# Patient Record
Sex: MALE | Race: BLACK OR AFRICAN AMERICAN | NOT HISPANIC OR LATINO | Employment: UNEMPLOYED | URBAN - METROPOLITAN AREA
[De-identification: names, ages, dates, MRNs, and addresses within clinical notes are randomized per-mention and may not be internally consistent; named-entity substitution may affect disease eponyms.]

---

## 2017-05-25 ENCOUNTER — HOSPITAL ENCOUNTER (EMERGENCY)
Facility: CLINIC | Age: 37
Discharge: HOME | End: 2017-05-25
Attending: EMERGENCY MEDICINE | Admitting: EMERGENCY MEDICINE

## 2017-05-25 VITALS
TEMPERATURE: 98.6 F | SYSTOLIC BLOOD PRESSURE: 147 MMHG | DIASTOLIC BLOOD PRESSURE: 103 MMHG | OXYGEN SATURATION: 98 % | HEIGHT: 72 IN

## 2017-05-25 DIAGNOSIS — H61.23 BILATERAL IMPACTED CERUMEN: ICD-10-CM

## 2017-05-25 DIAGNOSIS — H66.92 LEFT OTITIS MEDIA, UNSPECIFIED CHRONICITY, UNSPECIFIED OTITIS MEDIA TYPE: ICD-10-CM

## 2017-05-25 PROCEDURE — 99283 EMERGENCY DEPT VISIT LOW MDM: CPT | Mod: 25

## 2017-05-25 PROCEDURE — 69209 REMOVE IMPACTED EAR WAX UNI: CPT | Mod: 50

## 2017-05-25 PROCEDURE — 99282 EMERGENCY DEPT VISIT SF MDM: CPT

## 2017-05-25 RX ORDER — AMOXICILLIN 500 MG/1
500 CAPSULE ORAL 3 TIMES DAILY
Qty: 30 CAPSULE | Refills: 0 | Status: SHIPPED | OUTPATIENT
Start: 2017-05-25 | End: 2017-06-04

## 2017-05-25 ASSESSMENT — ENCOUNTER SYMPTOMS
VOMITING: 0
SINUS PRESSURE: 1
FEVER: 0
RHINORRHEA: 0

## 2017-05-25 NOTE — ED AVS SNAPSHOT
Emergency Department    [address] [facility] [address]    Phone:  [phone]    Fax:  [phone]                                       Tanvi Tinajero   MRN: [id number]    Department:   Emergency Department   Date of Visit:  5/25/2017           Patient Information     Date Of Birth          1980        Your diagnoses for this visit were:     Bilateral impacted cerumen     Left otitis media, unspecified chronicity, unspecified otitis media type        You were seen by Vernon Montaño DO.      Follow-up Information     Follow up with [facility] In 2 days.    Specialty:  Family Practice    Contact information:    [address] [facility] [address] [phone]        Follow up with  Emergency Department.    Specialty:  EMERGENCY MEDICINE    Why:  If symptoms worsen    Contact information:    [address] [facility] [address] [phone]        Discharge Instructions         Earwax, Home Treatment    Everyone produces earwax from the lining of the ear canal. It serves to lubricate and protect the ear. The wax that forms in the canal naturally moves toward the outside of the ear and falls out. Sometimes the ear canal may contain too much wax. This can cause a blockage and loss of hearing. Directions are given below for home treatment.  Home care  If your doctor has advised you to remove a wax blockage yourself, follow these directions:    Unless a medicine was prescribed, you may use an over-the-counter product made for clearing earwax. These contain carbamide peroxide. Lie down with the blocked ear facing upward. Apply one dropper full of medicine and wait a few minutes. Grasp the outer ear and wiggle it to help the solution enter the canal.    Lean over a sink or basin with the blocked ear facing downward. Use a bulb syringe filled with warm (not hot or cold) water to rinse the ear several times. Use gentle pressure  only.    If you are having trouble draining the water out of your ear canal, put a few drops of rubbing alcohol (isopropyl alcohol) into the ear canal. This will help remove the remaining water.    Repeat this procedure once a day for up to three days, or until your hearing is back to normal. Do not use this treatment for more than three days in a row.  Don ts    Don t use cold water to rinse the ear. This will make you dizzy.    Don t perform this procedure if you have an ear infection.    Don t perform this procedure if you have a ruptured eardrum.    Don t use cotton swabs, matches, hairpins, keys, or other objects to  clean  the ear canal. This can cause infection of the ear canal or rupture the eardrum. Because of their size and shape, cotton swabs can push earwax deeper into the ear canal instead of removing it.  Follow-up care  Follow up with your health care provider if you are not improving after three cleaning attempts, or as advised.  When to seek medical advice  Call your health care provider right away if any of these occur:    Worsening ear pain    Fever of 101 F (38.3 C) or higher, or as directed by your health care provider    Hearing does not return to normal after three days of treatment    Fluid drainage or bleeding from the ear canal    Swelling, redness, or tenderness of the outer ear    Headache, neck pain, or stiff neck    0703-0373 [employer]. [address] [place] [address]. All rights reserved. This information is not intended as a substitute for professional medical care. Always follow your healthcare professional's instructions.          24 Hour Appointment Hotline       To make an appointment at any [facility], call [phone] ([phone]). If you don't have a family doctor or clinic, we will help you find one. [facility] clinics are conveniently located to serve the needs of you and your family.             Review of your medicines      START taking         Dose / Directions Last dose taken    amoxicillin 500 MG capsule   Commonly known as:  AMOXIL   Dose:  500 mg   Quantity:  30 capsule        Take 1 capsule (500 mg) by mouth 3 times daily for 10 days   Refills:  0                Prescriptions were sent or printed at these locations (1 Prescription)                   formerly [facility] [address]  [place], [place] - [address] AT [address] [place] [address] [place] [address]    Telephone:  [phone]   Fax:  [phone]   Hours:                  E-Prescribed (1 of 1)         amoxicillin (AMOXIL) 500 MG capsule                Orders Needing Specimen Collection     None      Pending Results     No orders found from 5/23/2017 to 5/26/2017.            Pending Culture Results     No orders found from 5/23/2017 to 5/26/2017.            Pending Results Instructions     If you had any lab results that were not finalized at the time of your Discharge, you can call the ED Lab Result RN at [phone]. You will be contacted by this team for any positive Lab results or changes in treatment. The nurses are available 7 days a week from 10A to 6:30P.  You can leave a message 24 hours per day and they will return your call.        Test Results From Your Hospital Stay               Clinical Quality Measure: Blood Pressure Screening     Your blood pressure was checked while you were in the emergency department today. The last reading we obtained was  BP: (!) 147/103 . Please read the guidelines below about what these numbers mean and what you should do about them.  If your systolic blood pressure (the top number) is less than 120 and your diastolic blood pressure (the bottom number) is less than 80, then your blood pressure is normal. There is nothing more that you need to do about it.  If your systolic blood pressure (the top number) is 120-139 or your diastolic blood pressure (the bottom number) is 80-89, your blood pressure may be higher than it should be.  "You should have your blood pressure rechecked within a year by a primary care provider.  If your systolic blood pressure (the top number) is 140 or greater or your diastolic blood pressure (the bottom number) is 90 or greater, you may have high blood pressure. High blood pressure is treatable, but if left untreated over time it can put you at risk for heart attack, stroke, or kidney failure. You should have your blood pressure rechecked by a primary care provider within the next 4 weeks.  If your provider in the emergency department today gave you specific instructions to follow-up with your doctor or provider even sooner than that, you should follow that instruction and not wait for up to 4 weeks for your follow-up visit.        Thank you for choosing [facility]       Thank you for choosing [facility] for your care. Our goal is always to provide you with excellent care. Hearing back from our patients is one way we can continue to improve our services. Please take a few minutes to complete the written survey that you may receive in the mail after you visit with us. Thank you!        vushaperhariContact Information     [employer] lets you send messages to your doctor, view your test results, renew your prescriptions, schedule appointments and more. To sign up, go to [email or web address] . Click on \"Log in\" on the left side of the screen, which will take you to the Welcome page. Then click on \"Sign up Now\" on the right side of the page.     You will be asked to enter the access code listed below, as well as some personal information. Please follow the directions to create your username and password.     Your access code is: [email or web address]  Expires: 2017  1:52 AM     Your access code will  in 90 days. If you need help or a new code, please call your [facility] clinic or [phone].        Care EveryWhere ID     This is your Care EveryWhere ID. This could be used by other organizations to access your [facility] medical " records  [id number]        After Visit Summary       This is your record. Keep this with you and show to your community pharmacist(s) and doctor(s) at your next visit.

## 2017-05-25 NOTE — DISCHARGE INSTRUCTIONS
Earwax, Home Treatment    Everyone produces earwax from the lining of the ear canal. It serves to lubricate and protect the ear. The wax that forms in the canal naturally moves toward the outside of the ear and falls out. Sometimes the ear canal may contain too much wax. This can cause a blockage and loss of hearing. Directions are given below for home treatment.  Home care  If your doctor has advised you to remove a wax blockage yourself, follow these directions:    Unless a medicine was prescribed, you may use an over-the-counter product made for clearing earwax. These contain carbamide peroxide. Lie down with the blocked ear facing upward. Apply one dropper full of medicine and wait a few minutes. Grasp the outer ear and wiggle it to help the solution enter the canal.    Lean over a sink or basin with the blocked ear facing downward. Use a bulb syringe filled with warm (not hot or cold) water to rinse the ear several times. Use gentle pressure only.    If you are having trouble draining the water out of your ear canal, put a few drops of rubbing alcohol (isopropyl alcohol) into the ear canal. This will help remove the remaining water.    Repeat this procedure once a day for up to three days, or until your hearing is back to normal. Do not use this treatment for more than three days in a row.  Don ts    Don t use cold water to rinse the ear. This will make you dizzy.    Don t perform this procedure if you have an ear infection.    Don t perform this procedure if you have a ruptured eardrum.    Don t use cotton swabs, matches, hairpins, keys, or other objects to  clean  the ear canal. This can cause infection of the ear canal or rupture the eardrum. Because of their size and shape, cotton swabs can push earwax deeper into the ear canal instead of removing it.  Follow-up care  Follow up with your health care provider if you are not improving after three cleaning attempts, or as advised.  When to seek medical  advice  Call your health care provider right away if any of these occur:    Worsening ear pain    Fever of 101 F (38.3 C) or higher, or as directed by your health care provider    Hearing does not return to normal after three days of treatment    Fluid drainage or bleeding from the ear canal    Swelling, redness, or tenderness of the outer ear    Headache, neck pain, or stiff neck    1227-9055 [employer]. [address] [place] [address]. All rights reserved. This information is not intended as a substitute for professional medical care. Always follow your healthcare professional's instructions.

## 2017-05-25 NOTE — ED PROVIDER NOTES
History     Chief Complaint:  Otalgia    HPI   Tanvi Tinajero is a 37 year old male who presents to the emergency department for evaluation of bilateral otalgia. The patient reported that he has been experiencing bilateral ear pain for the last 30 days and has attempted to alleviate the pain with over-the-counter medications and cleaning of the ears with cotton tip swabs, but was unsuccessful. He also reported that he was also experiencing upper respiratory congestion and nasal congestion. He denies experiencing any rhinorrhea, fever, rash or exposure to measles.     Allergies:  No Known Drug Allergies      Medications:    The patient is not currently taking any prescribed medications.     Past Medical History:    The patient denies any relevant past medical history.     Past Surgical History:    History reviewed. No pertinent past surgical history.     Family History:    The patient denies any relevant family medical history.     Social History:  The patient was accompanied to the ED alone.  Smoking Status: N/A  Smokeless Tobacco: N/A  Alcohol Use: N/A      Review of Systems   Constitutional: Negative for fever.   HENT: Positive for ear pain and sinus pressure (Nasal congestion). Negative for rhinorrhea.    Gastrointestinal: Negative for vomiting.   Skin: Negative for rash.     Physical Exam   First Vitals:  Patient Vitals for the past 24 hrs:   BP Temp Temp src Heart Rate SpO2 Height   05/25/17 0026 (!) 147/103 98.6  F (37  C) Oral 108 98 % 1.829 m (6')          Physical Exam  Physical Exam   General:  Sitting on bed alone, comfortable appearing.   HENT:  No obvious trauma to head  Right Ear:  External ear normal. Cerumen impaction in ear canal. TM was normal in appearance after irrigation.  Left Ear:  External ear normal. Cerumen impaction in ear canal.TM was erythematous and bulging after irrigation.  Nose:  Nose normal.   Eyes:  Conjunctivae and EOM are normal.  Neck: Normal range of motion. Neck  supple. No tracheal deviation present.   Pulm/Chest: No respiratory distress  M/S: Normal range of motion.   Neuro: Alert. GCS 15.  Skin: Skin is warm and dry. No rash noted. Not diaphoretic.   Psych: Normal mood and affect. Behavior is normal.     Emergency Department Course   Emergency Department Course:  Nursing notes and vitals reviewed.  I performed an exam of the patient as documented above.   Cerumen disimpaction by tech.    I discussed the treatment plan with the patient. They expressed understanding of this plan and consented to discharge. They will be discharged home with instructions for care and follow up. In addition, the patient will return to the emergency department if their symptoms persist, worsen, if new symptoms arise or if there is any concern.  All questions were answered.    I personally reviewed the laboratory results with the Patient and answered all related questions prior to discharge.    Impression & Plan      Medical Decision Making:  Tanvi Tinajero is a very pleasant 37 year old male who presents for evaluation of otalgia on each side.  The patient has an exam consistent with bilateral cerumen impaction.  Differential considered in this patient with otalgia included mastoiditis, meningitis, perforation, mass, dental abscess, or peritonsillar abscess, referred pain, cholesteatoma, otitis externa, etc. Given exam, the most likely etiology of the pain is impacted cerumen. Once the cerumen was removed, the pt was noted to have a left TM that was erythematous and bulging and pain was still present. There was no evidence of perforation. He will be placed on amoxicillin for this. Pain was alleviated with flushing the canals.  He may take Tylenol or Ibuprofen or auralgan for pain.  Return if increasing pain, fever, decrease in hearing or ear discharge.  Follow-up with primary physician in 7-10 days, if symptoms persist then an ENT consultation may be needed as outpatient.    The  treatment plan was discussed with the patient and they expressed understanding of this plan and consented to the plan.  In addition, the patient will return to the emergency department if their symptoms persist, worsen, if new symptoms arise or if there is any concern as other pathology may be present that is not evident at this time. They also understand the importance of close follow up in the clinic and if unable to do so will return to the emergency department for a reevaluation. All questions were answered.    Diagnosis:    ICD-10-CM    1. Bilateral impacted cerumen H61.23    2. Left otitis media, unspecified chronicity, unspecified otitis media type H66.92        Disposition:   Discharged    Scribe Disclosure:  Hafsa KAUR, am serving as a scribe at 12:32 AM on 5/25/2017 to document services personally performed by Vernon Montaño DO, based on my observations and the provider's statements to me.    5/25/2017    EMERGENCY DEPARTMENT       Vernon Montaño DO  05/25/17 0235

## 2017-05-25 NOTE — ED AVS SNAPSHOT
Emergency Department    [address] [place] [address]    Phone:  [phone]    Fax:  [phone]                                       Tanvi Tinajero   MRN: [id number]    Department:   Emergency Department   Date of Visit:  5/25/2017           After Visit Summary Signature Page     I have received my discharge instructions, and my questions have been answered. I have discussed any challenges I see with this plan with the nurse or doctor.    ..........................................................................................................................................  Patient/Patient Representative Signature      ..........................................................................................................................................  Patient Representative Print Name and Relationship to Patient    ..................................................               ................................................  Date                                            Time    ..........................................................................................................................................  Reviewed by Signature/Title    ...................................................              ..............................................  Date                                                            Time

## 2017-06-01 ENCOUNTER — OFFICE VISIT (OUTPATIENT)
Dept: FAMILY MEDICINE | Facility: CLINIC | Age: 37
End: 2017-06-01

## 2017-06-01 VITALS
BODY MASS INDEX: 24.08 KG/M2 | WEIGHT: 158.4 LBS | SYSTOLIC BLOOD PRESSURE: 101 MMHG | OXYGEN SATURATION: 98 % | HEART RATE: 65 BPM | RESPIRATION RATE: 16 BRPM | DIASTOLIC BLOOD PRESSURE: 69 MMHG | TEMPERATURE: 97.9 F

## 2017-06-01 DIAGNOSIS — K59.01 SLOW TRANSIT CONSTIPATION: ICD-10-CM

## 2017-06-01 ASSESSMENT — ENCOUNTER SYMPTOMS
VOMITING: 0
FREQUENCY: 0
HEADACHES: 0
DYSURIA: 0
MYALGIAS: 0
FEVER: 0
DIARRHEA: 0
LIGHT-HEADEDNESS: 0
WHEEZING: 0
HEMATURIA: 0
UNEXPECTED WEIGHT CHANGE: 0
JOINT SWELLING: 0
RHINORRHEA: 0
NAUSEA: 0
WEAKNESS: 0
SORE THROAT: 0
CONSTIPATION: 1
DIZZINESS: 0
ABDOMINAL PAIN: 0
CHILLS: 0
TROUBLE SWALLOWING: 0
PALPITATIONS: 0
SHORTNESS OF BREATH: 0
FATIGUE: 1

## 2017-06-01 NOTE — PROGRESS NOTES
HPI:       Toribio Rooney is a 37 year old who presents for the following  Patient presents with:  Physical: annual physical     Mr. Rooney is here for a physical exam today. He says it's been a long time since coming to the doctor. He is not here for a specific complaint but states that his back and shoulders occasionally itch but are not painful. He complains of constipation; he is only going twice each week. Lastly, Mr. Rooney complains of occasional fatigue.     Mr. Rooney also requested vitamin D.     A [employer]  was used for  this visit.      Problem, Medication and Allergy Lists were reviewed and are current.  Patient is an established patient of this clinic.         Review of Systems:   Review of Systems   Constitutional: Positive for fatigue. Negative for chills, fever and unexpected weight change.   HENT: Negative for congestion, rhinorrhea, sore throat and trouble swallowing.    Eyes: Negative for visual disturbance.   Respiratory: Negative for shortness of breath and wheezing.    Cardiovascular: Negative for chest pain and palpitations.   Gastrointestinal: Positive for constipation. Negative for abdominal pain, diarrhea, nausea and vomiting.   Genitourinary: Negative for dysuria, frequency and hematuria.   Musculoskeletal: Negative for joint swelling and myalgias.   Skin: Negative for rash.   Neurological: Negative for dizziness, weakness, light-headedness and headaches.             Physical Exam:   Patient Vitals for the past 24 hrs:   BP Temp Temp src Pulse Resp SpO2 Weight   06/01/17 0931 101/69 97.9  F (36.6  C) Oral 65 16 98 % 158 lb 6.4 oz (71.8 kg)     Body mass index is 24.08 kg/(m^2).  Vitals were reviewed and were normal     Physical Exam   Constitutional: He is oriented to person, place, and time. No distress.   HENT:   Head: Normocephalic and atraumatic.   Mouth/Throat: Oropharynx is clear and moist.   Eyes: Conjunctivae and EOM are normal. Pupils are equal, round, and  reactive to light. No scleral icterus.   Neck: Normal range of motion. Neck supple. No JVD present.   Cardiovascular: Normal rate, regular rhythm and normal heart sounds.  Exam reveals no gallop and no friction rub.    No murmur heard.  Pulmonary/Chest: Effort normal and breath sounds normal. No respiratory distress. He has no wheezes. He has no rales.   Abdominal: Soft. Bowel sounds are normal. He exhibits no distension. There is no tenderness. There is no rebound and no guarding.   Moderately increased abdominal circumference.    Musculoskeletal: Normal range of motion. He exhibits no edema or deformity.   Neurological: He is alert and oriented to person, place, and time. No cranial nerve deficit. Coordination normal.   Skin: Skin is warm and dry. No rash noted. He is not diaphoretic. No erythema.   Psychiatric: He has a normal mood and affect. His behavior is normal.         Results:     None.     Assessment and Plan     #. Constipation, likely 2/2 poor fiber intake. He has been taking bisacodyl regularly without noticing a significant change.   - Recommended, life style modification: Eat on daily basis: Papaya, prunes, magen and beans. Aerobic exercises 30 minutes 5 times per week.     #. Basic Health Maintenance  Mr. Rooney's weight gain and activity was discussed today. He was counseled to increase his daily exercise activity and decrease the amount of sugar he consumes on a daily basis. He was counseled on the risk of heart and liver disease as well as diabetes if his weight continues to increase. Vitamin D was discussed and patient was counseled to get more sun exposure during the summer rather than taking vitamin D supplements.   - Dietary changes; less sugar  - Increase physical activity / exercise   - Increase outdoor activity    Medications Discontinued During This Encounter   Medication Reason     oxyCODONE (ROXICODONE) 5 MG immediate release tablet Stopped by Patient     bisacodyl (DULCOLAX) 5 MG EC  tablet Not Effective     Options for treatment and follow-up care were reviewed with the patient. Toribio Rooney  engaged in the decision making process and verbalized understanding of the options discussed and agreed with the final plan.    Scribed by Moy Mckeon, MS4, on behalf of MD Dewayne Brennan MD

## 2017-06-01 NOTE — MR AVS SNAPSHOT
After Visit Summary   6/1/2017    Toribio Rooney    MRN: [id number]           Patient Information     Date Of Birth          1980        Visit Information        Provider Department      6/1/2017 9:20 AM Dewayne Payne MD [facility]        Today's Diagnoses     Slow transit constipation           Follow-ups after your visit        Follow-up notes from your care team     Return in about 1 month (around 7/1/2017) for Symptoms follow up..      Who to contact     Please call your clinic at [phone] to:    Ask questions about your health    Make or cancel appointments    Discuss your medicines    Learn about your test results    Speak to your doctor   If you have compliments or concerns about an experience at your clinic, or if you wish to file a complaint, please contact [facility] Patient Relations at [phone] or email us at [email or web address]         Additional Information About Your Visit        MyChart Information     [employer] gives you secure access to your electronic health record. If you see a primary care provider, you can also send messages to your care team and make appointments. If you have questions, please call your primary care clinic.  If you do not have a primary care provider, please call [phone] and they will assist you.      [employer] is an electronic gateway that provides easy, online access to your medical records. With [employer], you can request a clinic appointment, read your test results, renew a prescription or communicate with your care team.     To access your existing account, please contact your [facility] or call [phone] for assistance.        Care EveryWhere ID     This is your Care EveryWhere ID. This could be used by other organizations to access your [place] medical records  [id number]        Your Vitals Were     Pulse Temperature Respirations Pulse  Oximetry BMI (Body Mass Index)       65 97.9  F (36.6  C) (Oral) 16 98% 24.08 kg/m2        Blood Pressure from Last 3 Encounters:   06/01/17 101/69   06/15/16 101/71   04/12/16 101/59    Weight from Last 3 Encounters:   06/01/17 158 lb 6.4 oz (71.8 kg)   06/15/16 154 lb 3.2 oz (69.9 kg)   03/16/16 155 lb (70.3 kg)              Today, you had the following     No orders found for display         Today's Medication Changes          These changes are accurate as of: 6/1/17  1:17 PM.  If you have any questions, ask your nurse or doctor.               These medicines have changed or have updated prescriptions.        Dose/Directions    ibuprofen 600 MG tablet   Commonly known as:  ADVIL/MOTRIN   This may have changed:  how much to take   Used for:  Neck pain        Dose:  600 mg   Take 1 tablet (600 mg) by mouth every 6 hours as needed for moderate pain   Quantity:  90 tablet   Refills:  1         Stop taking these medicines if you haven't already. Please contact your care team if you have questions.     bisacodyl 5 MG EC tablet   Commonly known as:  DULCOLAX   Stopped by:  Dewayne Payne MD                    Primary Care Provider Office Phone # Fax #    Mirza Sosa [phone]       [facility] [address] [facility] [address]        Thank you!     Thank you for choosing [facility]  for your care. Our goal is always to provide you with excellent care. Hearing back from our patients is one way we can continue to improve our services. Please take a few minutes to complete the written survey that you may receive in the mail after your visit with us. Thank you!             Your Updated Medication List - Protect others around you: Learn how to safely use, store and throw away your medicines at www.disposemymeds.org.          This list is accurate as of: 6/1/17  1:17 PM.  Always use your most recent med list.                   Brand Name Dispense Instructions for use     acetaminophen 325 MG tablet    TYLENOL    100 tablet    Take 3 tablets (975 mg) by mouth every 8 hours       ibuprofen 600 MG tablet    ADVIL/MOTRIN    90 tablet    Take 1 tablet (600 mg) by mouth every 6 hours as needed for moderate pain       PARoxetine 20 MG tablet    PAXIL    60 tablet    Take 1 tablet (20 mg) by mouth every morning

## 2017-07-07 ENCOUNTER — OFFICE VISIT (OUTPATIENT)
Dept: FAMILY MEDICINE | Facility: CLINIC | Age: 37
End: 2017-07-07

## 2017-07-07 VITALS
OXYGEN SATURATION: 97 % | BODY MASS INDEX: 24.05 KG/M2 | RESPIRATION RATE: 16 BRPM | DIASTOLIC BLOOD PRESSURE: 76 MMHG | WEIGHT: 158.2 LBS | HEART RATE: 80 BPM | SYSTOLIC BLOOD PRESSURE: 108 MMHG | TEMPERATURE: 97.9 F

## 2017-07-07 DIAGNOSIS — B00.9 HERPES SIMPLEX VIRUS (HSV) INFECTION: Primary | ICD-10-CM

## 2017-07-07 RX ORDER — VALACYCLOVIR HYDROCHLORIDE 500 MG/1
500 TABLET, FILM COATED ORAL DAILY
Qty: 90 TABLET | Refills: 3 | Status: SHIPPED | OUTPATIENT
Start: 2017-07-07 | End: 2018-08-10

## 2017-07-07 RX ORDER — VALACYCLOVIR HYDROCHLORIDE 1 G/1
2000 TABLET, FILM COATED ORAL 2 TIMES DAILY
Qty: 4 TABLET | Refills: 0 | Status: SHIPPED | OUTPATIENT
Start: 2017-07-07 | End: 2017-07-08

## 2017-07-07 NOTE — MR AVS SNAPSHOT
After Visit Summary   7/7/2017    Toribio Rooney    MRN: [id number]           Patient Information     Date Of Birth          1980        Visit Information        Provider Department      7/7/2017 8:40 AM Mirza Sosa MD [facility]        Today's Diagnoses     Herpes simplex virus (HSV) infection    -  1      Care Instructions    Here is the plan from today's visit    1. Herpes simplex virus (HSV) infection  Take the larger pill 2 tablets two times a day today  Starting tomorrow, take the smaller pill once daily   - valACYclovir (VALTREX) 1000 mg tablet; Take 2 tablets (2,000 mg) by mouth 2 times daily for 1 day  Dispense: 4 tablet; Refill: 0  - valACYclovir (VALTREX) 500 MG tablet; Take 1 tablet (500 mg) by mouth daily  Dispense: 90 tablet; Refill: 3    Please call or return to clinic if your symptoms don't go away.    Thank you for coming to [facility] today.  Lab Testing:  **If you had lab testing today and your results are reassuring or normal they will be mailed to you or sent through [employer] within 7 days.   **If the lab tests need quick action we will call you with the results.  The phone number we will call with results is # [phone] (home) . If this is not the best number please call our clinic and change the number.  Medication Refills:  If you need any refills please call your pharmacy and they will contact us.   If you need to  your refill at a new pharmacy, please contact the new pharmacy directly. The new pharmacy will help you get your medications transferred faster.   Scheduling:  If you have any concerns about today's visit or wish to schedule another appointment please call our office during normal business hours [phone] (8-5:00 M-F)  If a referral was made to a [facility] and you don't get a call from central scheduling please call [phone].  If a Mammogram was ordered for you at [facility] call  [phone] to schedule or change your appointment.  If you had an XRay/CT/Ultrasound/MRI ordered the number is [phone] to schedule or change your radiology appointment.   Medical Concerns:  If you have urgent medical concerns please call [phone] at any time of the day.        Cold Sores (Fever Blisters)  What are cold sores?   Cold sores are annoying, small, painful blisters on the lips and nearby skin, including in the nose and mouth. They usually appear when you are sick or stressed. They are also called fever blisters.   How do they occur?   Cold sores are caused by the herpes simplex virus (HSV). This virus also causes genital herpes. The fluid in the blisters contains live virus. The virus in this fluid can easily be spread from one person to another. The infection can be spread, for example, by kissing, by sharing food or drink, or by not washing your hands after touching the sores.   Once you are infected, the virus continues to live in the body, even after the sores are gone. The virus may become active again and cause more cold sores at any time. It is especially likely to become active again during or after:   skin injury, such as a scrape or too much exposure to the sun   physical illness, such as a cold or flu   dental treatment   emotional stress   fatigue   hormonal changes caused by pregnancy or a woman's menstrual cycle.   It is not possible to predict how often you will have cold sores. Some people never have them again after the first time, but others have them regularly.   What are the symptoms?   About 24 hours before you can see blisters, you may have a sense of numbness, tingling, itching, or burning. Then a small cluster of tiny blisters appears on your lip or the skin around your lips. The blisters may be somewhat painful. Over the next few days, the blisters break and fluid drains out. This fluid is very contagious. As the blisters dry, they become sores that are covered with a  yellowish dried crust and they become less painful.   How are they diagnosed?   Your healthcare provider can usually determine from your history and a physical exam whether the blisters are fever blisters. Fluid from the blisters may be tested in the lab to confirm the diagnosis of herpes simplex virus. The test is called a viral culture.   How are they treated?   There are many nonprescription medications that provide some relief from the symptoms. A nonprescription antiviral medicine applied several times a day to the area as soon as the symptoms start may lessen symptoms. It may also help the sores heal more quickly.   Your healthcare provider may prescribe an oral antiviral medicine, such as acyclovir, valacyclovir, or famciclovir. This medicine stops the virus from making more viruses in your body. To prevent the blisters, the medicine must be taken when you first start having symptoms. The medicine does not get rid of the virus, but it can decrease the number of days you have symptoms and help the blisters dry up more quickly. If you have cold sores often, your provider may recommend taking antiviral medicine daily to try to stop outbreaks from recurring. Or your provider may prescribe an antiviral medicine for you to take when you know you are going to be exposed to something that causes you to have cold sores, such as a lot of sun or stress   How long will the effects last?   The blisters usually last 7 to 10 days. They should be considered contagious as long as you have any moist secretions from the blisters. They may return often (for example, several times a year) or rarely, such as once every few years.   How can I take care of myself?   Taking a nonprescription painkiller such as aspirin, acetaminophen, or ibuprofen may help cold sores feel less painful. Check with your healthcare provider before you give any medicine that contains aspirin or salicylates to a child or teen. This includes medicines like  baby aspirin, some cold medicines, and Pepto Bismol. Children and teens who take aspirin are at risk for a serious illness called Reye's syndrome. Ibuprofen is an NSAID. Nonsteroidal anti-inflammatory medicines (NSAIDs) may cause stomach bleeding and other problems. These risks increase with age. Read the label and take as directed. Unless recommended by your healthcare provider, do not take NSAIDs for more than 10 days for any reason.   Putting ice on the blisters may also help lessen the pain.   What can I do to help prevent cold sores?   Use a lip balm containing sunscreen whenever your lips are exposed to the sun. Avoid being out in the sun too much. This often causes outbreaks of blisters.   To prevent spreading the virus to other parts of your body:  Avoid touching any area of the body where there is tingling, itching, burning, or blisters. (This is very important when the blisters are draining.) Also avoid contact with items that touch the sores, such as eating utensils and facial tissues.   Practice good hand washing, especially after putting medicine on the sores.   Take care to avoid spreading the virus to other susceptible areas of your body, such as the eyes and genitals.   To prevent spreading the virus to others:   Avoid kissing and any other contact of the sores with another person's skin.   Avoid sharing soaps, washcloths, cosmetics (especially lip balm, lipstick, etc.), and utensils for eating or drinking. Dispose of or wash your personal items (such as tissues and eating utensils) yourself.   Just as genital herpes can be spread to the mouth by oral-genital sex, cold sores can be spread to the genitals by oral-genital sex. Be careful not to pass the oral cold sores to your sexual partner(s).   To prevent getting the cold sore virus from someone else:  If you are caring for someone with the herpes virus, do not touch the sores directly. Use gloves or gauze to apply medicine.   Avoid kissing or  touching another person's cold sore with any part of your body.   Avoid sharing soap, towels, cosmetics, food, or drink with someone who has cold sores.     Published by [employer].  This content is reviewed periodically and is subject to change as new health information becomes available. The information is intended to inform and educate and is not a replacement for medical evaluation, advice, diagnosis or treatment by a healthcare professional.   Developed by [employer].   ? 2010 [employer] and/or its affiliates. All Rights Reserved.   Copyright   [employer] 2011                  Follow-ups after your visit        Who to contact     Please call your clinic at [phone] to:    Ask questions about your health    Make or cancel appointments    Discuss your medicines    Learn about your test results    Speak to your doctor   If you have compliments or concerns about an experience at your clinic, or if you wish to file a complaint, please contact [facility] Patient Relations at [phone] or email us at [email or web address]         Additional Information About Your Visit        Lessnohart Information     [employer] gives you secure access to your electronic health record. If you see a primary care provider, you can also send messages to your care team and make appointments. If you have questions, please call your primary care clinic.  If you do not have a primary care provider, please call [phone] and they will assist you.      [employer] is an electronic gateway that provides easy, online access to your medical records. With [employer], you can request a clinic appointment, read your test results, renew a prescription or communicate with your care team.     To access your existing account, please contact your [facility] or call [phone] for assistance.        Care EveryWhere ID     This is your Care EveryWhere ID. This could be  used by other organizations to access your [facility] medical records  [id number]        Your Vitals Were     Pulse Temperature Respirations Pulse Oximetry BMI (Body Mass Index)       80 97.9  F (36.6  C) (Oral) 16 97% 24.05 kg/m2        Blood Pressure from Last 3 Encounters:   07/07/17 108/76   06/01/17 101/69   06/15/16 101/71    Weight from Last 3 Encounters:   07/07/17 158 lb 3.2 oz (71.8 kg)   06/01/17 158 lb 6.4 oz (71.8 kg)   06/15/16 154 lb 3.2 oz (69.9 kg)              Today, you had the following     No orders found for display         Today's Medication Changes          These changes are accurate as of: 7/7/17  9:06 AM.  If you have any questions, ask your nurse or doctor.               Start taking these medicines.        Dose/Directions    * valACYclovir 1000 mg tablet   Commonly known as:  VALTREX   Used for:  Herpes simplex virus (HSV) infection   Started by:  Mirza Sosa MD        Dose:  2000 mg   Take 2 tablets (2,000 mg) by mouth 2 times daily for 1 day   Quantity:  4 tablet   Refills:  0       * valACYclovir 500 MG tablet   Commonly known as:  VALTREX   Used for:  Herpes simplex virus (HSV) infection   Started by:  Mirza Sosa MD        Dose:  500 mg   Take 1 tablet (500 mg) by mouth daily   Quantity:  90 tablet   Refills:  3       * Notice:  This list has 2 medication(s) that are the same as other medications prescribed for you. Read the directions carefully, and ask your doctor or other care provider to review them with you.         Where to get your medicines      These medications were sent to [facility] [place], [place] - [address] [facility] [address]     Phone:  [phone]     valACYclovir 1000 mg tablet    valACYclovir 500 MG tablet                Primary Care Provider Office Phone # Fax #    Mirza Sosa [phone]       [facility] [address] [facility] [address]        Equal Access to Services     JUSTUS MADRIGAL  AH: Erikaii augie ramíreztyeshanicho Juanali, wagirishda luqadaha, qaybta kaclaudia garza, waxindiana erin murraylaura garciamarshallaruna duarte. So [facility] [phone].    ATENCIÓN: Si tashi davis, tiene a warner disposición servicios gratuitos de asistencia lingüística. Llame al [phone].    We comply with applicable federal civil rights laws and [place] laws. We do not discriminate on the basis of race, color, national origin, age, disability sex, sexual orientation or gender identity.            Thank you!     Thank you for choosing [facility]  for your care. Our goal is always to provide you with excellent care. Hearing back from our patients is one way we can continue to improve our services. Please take a few minutes to complete the written survey that you may receive in the mail after your visit with us. Thank you!             Your Updated Medication List - Protect others around you: Learn how to safely use, store and throw away your medicines at www.disposemymeds.org.          This list is accurate as of: 7/7/17  9:06 AM.  Always use your most recent med list.                   Brand Name Dispense Instructions for use Diagnosis    acetaminophen 325 MG tablet    TYLENOL    100 tablet    Take 3 tablets (975 mg) by mouth every 8 hours    Lipoma of skin and subcutaneous tissue       ibuprofen 600 MG tablet    ADVIL/MOTRIN    90 tablet    Take 1 tablet (600 mg) by mouth every 6 hours as needed for moderate pain    Neck pain       PARoxetine 20 MG tablet    PAXIL    60 tablet    Take 1 tablet (20 mg) by mouth every morning    Premature ejaculation       * valACYclovir 1000 mg tablet    VALTREX    4 tablet    Take 2 tablets (2,000 mg) by mouth 2 times daily for 1 day    Herpes simplex virus (HSV) infection       * valACYclovir 500 MG tablet    VALTREX    90 tablet    Take 1 tablet (500 mg) by mouth daily    Herpes simplex virus (HSV) infection       * Notice:  This list has 2 medication(s) that are the same as  other medications prescribed for you. Read the directions carefully, and ask your doctor or other care provider to review them with you.

## 2017-07-07 NOTE — PATIENT INSTRUCTIONS
Here is the plan from today's visit    1. Herpes simplex virus (HSV) infection  Take the larger pill 2 tablets two times a day today  Starting tomorrow, take the smaller pill once daily   - valACYclovir (VALTREX) 1000 mg tablet; Take 2 tablets (2,000 mg) by mouth 2 times daily for 1 day  Dispense: 4 tablet; Refill: 0  - valACYclovir (VALTREX) 500 MG tablet; Take 1 tablet (500 mg) by mouth daily  Dispense: 90 tablet; Refill: 3    Please call or return to clinic if your symptoms don't go away.    Thank you for coming to [facility] today.  Lab Testing:  **If you had lab testing today and your results are reassuring or normal they will be mailed to you or sent through [employer] within 7 days.   **If the lab tests need quick action we will call you with the results.  The phone number we will call with results is # [phone] (home) . If this is not the best number please call our clinic and change the number.  Medication Refills:  If you need any refills please call your pharmacy and they will contact us.   If you need to  your refill at a new pharmacy, please contact the new pharmacy directly. The new pharmacy will help you get your medications transferred faster.   Scheduling:  If you have any concerns about today's visit or wish to schedule another appointment please call our office during normal business hours [phone] (8-5:00 M-F)  If a referral was made to a [facility] and you don't get a call from central scheduling please call [phone].  If a Mammogram was ordered for you at [facility] call [phone] to schedule or change your appointment.  If you had an XRay/CT/Ultrasound/MRI ordered the number is [phone] to schedule or change your radiology appointment.   Medical Concerns:  If you have urgent medical concerns please call [phone] at any time of the day.        Cold Sores (Fever Blisters)  What are cold sores?   Cold sores are annoying, small,  painful blisters on the lips and nearby skin, including in the nose and mouth. They usually appear when you are sick or stressed. They are also called fever blisters.   How do they occur?   Cold sores are caused by the herpes simplex virus (HSV). This virus also causes genital herpes. The fluid in the blisters contains live virus. The virus in this fluid can easily be spread from one person to another. The infection can be spread, for example, by kissing, by sharing food or drink, or by not washing your hands after touching the sores.   Once you are infected, the virus continues to live in the body, even after the sores are gone. The virus may become active again and cause more cold sores at any time. It is especially likely to become active again during or after:   skin injury, such as a scrape or too much exposure to the sun   physical illness, such as a cold or flu   dental treatment   emotional stress   fatigue   hormonal changes caused by pregnancy or a woman's menstrual cycle.   It is not possible to predict how often you will have cold sores. Some people never have them again after the first time, but others have them regularly.   What are the symptoms?   About 24 hours before you can see blisters, you may have a sense of numbness, tingling, itching, or burning. Then a small cluster of tiny blisters appears on your lip or the skin around your lips. The blisters may be somewhat painful. Over the next few days, the blisters break and fluid drains out. This fluid is very contagious. As the blisters dry, they become sores that are covered with a yellowish dried crust and they become less painful.   How are they diagnosed?   Your healthcare provider can usually determine from your history and a physical exam whether the blisters are fever blisters. Fluid from the blisters may be tested in the lab to confirm the diagnosis of herpes simplex virus. The test is called a viral culture.   How are they treated?   There  are many nonprescription medications that provide some relief from the symptoms. A nonprescription antiviral medicine applied several times a day to the area as soon as the symptoms start may lessen symptoms. It may also help the sores heal more quickly.   Your healthcare provider may prescribe an oral antiviral medicine, such as acyclovir, valacyclovir, or famciclovir. This medicine stops the virus from making more viruses in your body. To prevent the blisters, the medicine must be taken when you first start having symptoms. The medicine does not get rid of the virus, but it can decrease the number of days you have symptoms and help the blisters dry up more quickly. If you have cold sores often, your provider may recommend taking antiviral medicine daily to try to stop outbreaks from recurring. Or your provider may prescribe an antiviral medicine for you to take when you know you are going to be exposed to something that causes you to have cold sores, such as a lot of sun or stress   How long will the effects last?   The blisters usually last 7 to 10 days. They should be considered contagious as long as you have any moist secretions from the blisters. They may return often (for example, several times a year) or rarely, such as once every few years.   How can I take care of myself?   Taking a nonprescription painkiller such as aspirin, acetaminophen, or ibuprofen may help cold sores feel less painful. Check with your healthcare provider before you give any medicine that contains aspirin or salicylates to a child or teen. This includes medicines like baby aspirin, some cold medicines, and Pepto Bismol. Children and teens who take aspirin are at risk for a serious illness called Reye's syndrome. Ibuprofen is an NSAID. Nonsteroidal anti-inflammatory medicines (NSAIDs) may cause stomach bleeding and other problems. These risks increase with age. Read the label and take as directed. Unless recommended by your healthcare  provider, do not take NSAIDs for more than 10 days for any reason.   Putting ice on the blisters may also help lessen the pain.   What can I do to help prevent cold sores?   Use a lip balm containing sunscreen whenever your lips are exposed to the sun. Avoid being out in the sun too much. This often causes outbreaks of blisters.   To prevent spreading the virus to other parts of your body:  Avoid touching any area of the body where there is tingling, itching, burning, or blisters. (This is very important when the blisters are draining.) Also avoid contact with items that touch the sores, such as eating utensils and facial tissues.   Practice good hand washing, especially after putting medicine on the sores.   Take care to avoid spreading the virus to other susceptible areas of your body, such as the eyes and genitals.   To prevent spreading the virus to others:   Avoid kissing and any other contact of the sores with another person's skin.   Avoid sharing soaps, washcloths, cosmetics (especially lip balm, lipstick, etc.), and utensils for eating or drinking. Dispose of or wash your personal items (such as tissues and eating utensils) yourself.   Just as genital herpes can be spread to the mouth by oral-genital sex, cold sores can be spread to the genitals by oral-genital sex. Be careful not to pass the oral cold sores to your sexual partner(s).   To prevent getting the cold sore virus from someone else:  If you are caring for someone with the herpes virus, do not touch the sores directly. Use gloves or gauze to apply medicine.   Avoid kissing or touching another person's cold sore with any part of your body.   Avoid sharing soap, towels, cosmetics, food, or drink with someone who has cold sores.     Published by [employer]  This content is reviewed periodically and is subject to change as new health information becomes available. The information is intended to inform and educate and is not a replacement for medical  evaluation, advice, diagnosis or treatment by a healthcare professional.   Developed by [employer].   ? 2010 [employer] and/or its affiliates. All Rights Reserved.   Copyright   [employer] 2011

## 2017-08-25 ENCOUNTER — HOSPITAL ENCOUNTER (EMERGENCY)
Facility: CLINIC | Age: 37
Discharge: HOME | End: 2017-08-25
Attending: EMERGENCY MEDICINE | Admitting: EMERGENCY MEDICINE
Payer: COMMERCIAL

## 2017-08-25 VITALS
TEMPERATURE: 98.6 F | BODY MASS INDEX: 24.21 KG/M2 | OXYGEN SATURATION: 97 % | RESPIRATION RATE: 20 BRPM | HEART RATE: 78 BPM | SYSTOLIC BLOOD PRESSURE: 114 MMHG | DIASTOLIC BLOOD PRESSURE: 83 MMHG | WEIGHT: 159.2 LBS

## 2017-08-25 DIAGNOSIS — J06.9 VIRAL URI WITH COUGH: ICD-10-CM

## 2017-08-25 DIAGNOSIS — K59.09 CONSTIPATION, CHRONIC: ICD-10-CM

## 2017-08-25 PROCEDURE — 99282 EMERGENCY DEPT VISIT SF MDM: CPT | Mod: Z6 | Performed by: EMERGENCY MEDICINE

## 2017-08-25 PROCEDURE — 99282 EMERGENCY DEPT VISIT SF MDM: CPT | Performed by: EMERGENCY MEDICINE

## 2017-08-25 ASSESSMENT — ENCOUNTER SYMPTOMS
NAUSEA: 0
ABDOMINAL PAIN: 0
CONSTIPATION: 1
RHINORRHEA: 1
DIAPHORESIS: 1
SHORTNESS OF BREATH: 0
COUGH: 1
SORE THROAT: 1
DIARRHEA: 0
FEVER: 0
CHILLS: 1
VOMITING: 0
EYE PAIN: 0

## 2017-08-25 NOTE — ED AVS SNAPSHOT
[facility], [place], Emergency Department    [address] [place] [id number]    Phone:  [phone]                                       Toribio Rooney   MRN: [id number]    Department:  [facility], Emergency Department   Date of Visit:  8/25/2017           After Visit Summary Signature Page     I have received my discharge instructions, and my questions have been answered. I have discussed any challenges I see with this plan with the nurse or doctor.    ..........................................................................................................................................  Patient/Patient Representative Signature      ..........................................................................................................................................  Patient Representative Print Name and Relationship to Patient    ..................................................               ................................................  Date                                            Time    ..........................................................................................................................................  Reviewed by Signature/Title    ...................................................              ..............................................  Date                                                            Time

## 2017-08-25 NOTE — ED AVS SNAPSHOT
[facility], Emergency Department    [address] [address]    Phone:  [phone]                                       Toribio Rooney   MRN: [id number]    Department:  [facility], Emergency Department   Date of Visit:  8/25/2017           Patient Information     Date Of Birth          1980        Your diagnoses for this visit were:     Viral URI with cough     Constipation, chronic        You were seen by Sneha Whatley MD.        Discharge Instructions       You have been seen in the ER for a viral respiratory infection.  Unfortunately there is not medicine that we have to make a viral infection go away any faster.      You can take various medications to help with your symptoms.    For a cough and sore throat or fever, you can use Day Quil or Ny Quil to help with symptoms.  Ny Quil can make you sleepy - do not drive after taking this.    For a severe sore throat, you can use cepacol lozenges.  Make sure that you use the formulation that contains benzocaine in the ingredient list.  This is a topical numbing medicine that can help with discomfort.    You should drink plenty of fluids to keep hydrated while you have this infection.    You can use over the counter stool softeners like senna-s to help with constipation.     If you have any fever > 101 on a thermometer, if you have severe shortness of breath, or other new or concerning symptoms, come back to the ER.    If you are not getting any better after about 1 week, follow up with your clinic.    All of these medicines are available over the counter at any drugstore ([employer] or [employer] or [employer]).    Discharge References/Attachments     URI, VIRAL, NO ABX (ADULT) (ENGLISH)    CONSTIPATION, TREATING (ENGLISH)    SORE THROATS, SELF-CARE FOR (ENGLISH)    SORE THROAT, WHEN YOU HAVE A (ENGLISH)    VIRAL SYNDROME (ADULT) (ENGLISH)      24 Hour Appointment Hotline       To make an appointment at any [place] clinic, call  [phone] ([phone]). If you don't have a family doctor or clinic, we will help you find one. [facility] clinics are conveniently located to serve the needs of you and your family.             Review of your medicines      Our records show that you are taking the medicines listed below. If these are incorrect, please call your family doctor or clinic.        Dose / Directions Last dose taken    acetaminophen 325 MG tablet   Commonly known as:  TYLENOL   Dose:  1000 mg   Quantity:  100 tablet        Take 3 tablets (975 mg) by mouth every 8 hours   Refills:  0        ibuprofen 600 MG tablet   Commonly known as:  ADVIL/MOTRIN   Dose:  600 mg   Quantity:  90 tablet        Take 1 tablet (600 mg) by mouth every 6 hours as needed for moderate pain   Refills:  1        PARoxetine 20 MG tablet   Commonly known as:  PAXIL   Dose:  20 mg   Quantity:  60 tablet        Take 1 tablet (20 mg) by mouth every morning   Refills:  1        valACYclovir 500 MG tablet   Commonly known as:  VALTREX   Dose:  500 mg   Quantity:  90 tablet        Take 1 tablet (500 mg) by mouth daily   Refills:  3                Orders Needing Specimen Collection     None      Pending Results     No orders found from 8/23/2017 to 8/26/2017.            Pending Culture Results     No orders found from 8/23/2017 to 8/26/2017.            Pending Results Instructions     If you had any lab results that were not finalized at the time of your Discharge, you can call the ED Lab Result RN at [phone]. You will be contacted by this team for any positive Lab results or changes in treatment. The nurses are available 7 days a week from 10A to 6:30P.  You can leave a message 24 hours per day and they will return your call.        Thank you for choosing [facility]       Thank you for choosing [facility] for your care. Our goal is always to provide you with excellent care. Hearing back from our patients is one way we can continue to improve our services. Please  take a few minutes to complete the written survey that you may receive in the mail after you visit with us. Thank you!        [employer] Information     [employer] gives you secure access to your electronic health record. If you see a primary care provider, you can also send messages to your care team and make appointments. If you have questions, please call your primary care clinic.  If you do not have a primary care provider, please call [phone] and they will assist you.        Care EveryWhere ID     This is your Care EveryWhere ID. This could be used by other organizations to access your [facility] medical records  [id number]        Equal Access to Services     [facility] : Guadalupe Maddox, benjy fallon, lucille garza, tomasz duarte. So [facility] [phone].    ATENCIÓN: Si habla español, tiene a warner disposición servicios gratuitos de asistencia lingüística. Veneciaame al [phone].    We comply with applicable federal civil rights laws and [place] laws. We do not discriminate on the basis of race, color, national origin, age, disability sex, sexual orientation or gender identity.            After Visit Summary       This is your record. Keep this with you and show to your community pharmacist(s) and doctor(s) at your next visit.

## 2017-08-26 NOTE — ED NOTES
Patient arrived for cough, sore throat, runny nose, and the chills. He has been having this for two nights now and it is not getting better. He is alert, oriented, ambulatory denies exposure to sick people. He is having zoraida in his neck and the coughing is hard for him because of his throat pain.

## 2017-08-26 NOTE — DISCHARGE INSTRUCTIONS
You have been seen in the ER for a viral respiratory infection.  Unfortunately there is not medicine that we have to make a viral infection go away any faster.      You can take various medications to help with your symptoms.    For a cough and sore throat or fever, you can use Day Quil or Ny Quil to help with symptoms.  Ny Quil can make you sleepy - do not drive after taking this.    For a severe sore throat, you can use cepacol lozenges.  Make sure that you use the formulation that contains benzocaine in the ingredient list.  This is a topical numbing medicine that can help with discomfort.    You should drink plenty of fluids to keep hydrated while you have this infection.    You can use over the counter stool softeners like senna-s to help with constipation.     If you have any fever > 101 on a thermometer, if you have severe shortness of breath, or other new or concerning symptoms, come back to the ER.    If you are not getting any better after about 1 week, follow up with your clinic.    All of these medicines are available over the counter at any drugstore ([employer] or [employer] or [employer]).

## 2017-08-26 NOTE — ED PROVIDER NOTES
[place] EMERGENCY DEPARTMENT ([facility])  8/25/17   History     Chief Complaint   Patient presents with     Flu Symptoms     Cough     HPI  Toribio Rooney is a 37 year old male who presents to the Emergency Department for evaluation of a sore throat and a cough for the past two nights. Patient reports the symptoms only occur at night while he is trying to sleep. He notes increased pain in his throat with coughing. Patient also complains of diaphoresis, chills and rhinorrhea. He denies taking his temperature when having these symptoms. He also denies any nausea, vomiting, diarrhea, abdominal pain or sick contacts. He notes taking Tylenol last night at approximately 3 AM. Patient reports not eating well since the symptoms, but he has been drinking fluids. He also notes not having any bowel movements since before the symptoms started. Has a hx of chronic constipation.  No abdominal pain.     I have reviewed the Medications, Allergies, Past Medical and Surgical History, and Social History in the Epic system.    History reviewed. No pertinent past medical history.    Past Surgical History:   Procedure Laterality Date     BACK SURGERY  4/2016     EXCISE MASS BACK N/A 2/11/2016    Procedure: EXCISE MASS BACK;  Surgeon: Ernestina Calle MD;  Location:  OR       Family History   Problem Relation Age of Onset     Family history unknown: Yes       Social History   Substance Use Topics     Smoking status: Never Smoker     Smokeless tobacco: Never Used     Alcohol use No       No current facility-administered medications for this encounter.      Current Outpatient Prescriptions   Medication     valACYclovir (VALTREX) 500 MG tablet     PARoxetine (PAXIL) 20 MG tablet     acetaminophen (TYLENOL) 325 MG tablet     ibuprofen (ADVIL,MOTRIN) 600 MG tablet      No Known Allergies      Review of Systems   Constitutional: Positive for chills and diaphoresis. Negative for fever.   HENT: Positive for  rhinorrhea and sore throat.    Eyes: Negative for pain.   Respiratory: Positive for cough. Negative for shortness of breath.    Cardiovascular: Negative for chest pain.   Gastrointestinal: Positive for constipation. Negative for abdominal pain, diarrhea, nausea and vomiting.   All other systems reviewed and are negative.      Physical Exam   BP: (!) 127/93  Pulse: 104  Temp: 98.6  F (37  C)  Weight: 72.2 kg (159 lb 3.2 oz)  SpO2: 99 %  Physical Exam   Constitutional: No distress.   HENT:   Head: Atraumatic.   Mouth/Throat: Oropharynx is clear and moist. No oropharyngeal exudate.   Both TMs occluded by cerumen  Some oropharyngeal erythema noted in posterior oropharynx. No asymmetry or exudate noted.   Eyes: Pupils are equal, round, and reactive to light. No scleral icterus.   Cardiovascular: Normal rate, regular rhythm, normal heart sounds and intact distal pulses.    No murmur heard.  Pulmonary/Chest: Effort normal and breath sounds normal. No respiratory distress. He has no wheezes. He has no rales.   Abdominal: Soft. Bowel sounds are normal. There is no tenderness.   Musculoskeletal: He exhibits no edema or tenderness.   Lymphadenopathy:     He has cervical adenopathy.   Skin: Skin is warm. No rash noted. He is not diaphoretic.   Nursing note and vitals reviewed.      ED Course   9:09 PM  The patient was seen and examined by Sneha Whatley MD in Room ED19.     ED Course     Procedures             Critical Care time:  none             Labs Ordered and Resulted from Time of ED Arrival Up to the Time of Departure from the ED - No data to display         Assessments & Plan (with Medical Decision Making)   This is a previously healthy 37-year-old Stateless male who presents to the ED today with complaints of sore throat, nonproductive cough, nasal congestion, body aches.  Symptoms have been ongoing for 2 days.  It sounds as if he took one dose of Tylenol last night but other than that has not taken anything.  On exam he  is alert, has a dry nonproductive cough, no obvious distress.  He is afebrile.  Minimally tachycardic.  Exam of the oropharynx reveals some mild erythema but no sign of exudate or asymmetry.  Slight lymphadenopathy.  Both TMs occluded by cerumen.  Lungs are clear bilaterally though he does have rather constant dry cough.    I explained to the patient that he very likely has a viral syndrome causing his symptoms. I also explained that there is not any medication that we have that will make the virus go away any faster, it is simply a matter of his body fighting off the infection.  We can certainly recommend symptomatic treatment, he can use over-the-counter remedies such as DayQuil and/or NyQuil which can help with fever, pain, as well as cough.  He can certainly use Cepacol with benzocaine to help with sore throat.  We would recommend that he drink plenty of fluids to keep hydrated.  He asked for something for his bowel movements.  Apparently he s had some ongoing issues with constipation.  I can certainly recommend some over-the-counter stool softeners.  He should follow-up with his clinic which is [facility] within one week if not getting better.  Return to the Emergency Department if getting worse or if he s having shortness of breath or other new concerns.    This part of the medical record was transcribed by Jag Gonzalez, Medical Scribe, from a dictation done by Sneha Whatley MD.       I have reviewed the nursing notes.    I have reviewed the findings, diagnosis, plan and need for follow up with the patient.    New Prescriptions    No medications on file       Final diagnoses:   Viral URI with cough   Constipation, chronic     I, Jag Gonzalez, am serving as a trained medical scribe to document services personally performed by Sneha Whatley MD, based on the provider's statements to me.   I, Sneha Whatley MD, was physically present and have reviewed and verified the accuracy of this note  documented by Jag Gonzalez.    8/25/2017   [facility], [place], EMERGENCY DEPARTMENT     Sneha Whatley MD  08/25/17 [id number]

## 2017-12-13 ENCOUNTER — OFFICE VISIT (OUTPATIENT)
Dept: FAMILY MEDICINE | Facility: CLINIC | Age: 37
End: 2017-12-13
Payer: COMMERCIAL

## 2017-12-13 VITALS
OXYGEN SATURATION: 96 % | HEART RATE: 79 BPM | SYSTOLIC BLOOD PRESSURE: 107 MMHG | BODY MASS INDEX: 24.78 KG/M2 | DIASTOLIC BLOOD PRESSURE: 74 MMHG | TEMPERATURE: 98.1 F | WEIGHT: 163 LBS

## 2017-12-13 DIAGNOSIS — M79.10 GENERALIZED MUSCLE ACHE: Primary | ICD-10-CM

## 2017-12-13 DIAGNOSIS — B00.9 HERPES SIMPLEX VIRUS (HSV) INFECTION: ICD-10-CM

## 2017-12-13 NOTE — MR AVS SNAPSHOT
After Visit Summary   12/13/2017    Toribio Rooney    MRN: [id number]           Patient Information     Date Of Birth          1980        Visit Information        Provider Department      12/13/2017 10:00 AM Charles Inman MD [facility]        Today's Diagnoses     Generalized muscle ache    -  1      Care Instructions    Here is the plan from today's visit    1. Generalized muscle ache  - PHYSICAL THERAPY REFERRAL - EXTERNAL      Please call or return to clinic if your symptoms don't go away.    Follow up plan as needed    Thank you for coming to [facility] today.  Lab Testing:  **If you had lab testing today and your results are reassuring or normal they will be mailed to you or sent through [employer] within 7 days.   **If the lab tests need quick action we will call you with the results.  The phone number we will call with results is # [phone] (home) . If this is not the best number please call our clinic and change the number.  Medication Refills:  If you need any refills please call your pharmacy and they will contact us.   If you need to  your refill at a new pharmacy, please contact the new pharmacy directly. The new pharmacy will help you get your medications transferred faster.   Scheduling:  If you have any concerns about today's visit or wish to schedule another appointment please call our office during normal business hours [phone] (8-5:00 M-F)  If a referral was made to a [facility] and you don't get a call from central scheduling please call [phone].  If a Mammogram was ordered for you at [facility] call [phone] to schedule or change your appointment.  If you had an XRay/CT/Ultrasound/MRI ordered the number is [phone] to schedule or change your radiology appointment.   Medical Concerns:  If you have urgent medical concerns please call [phone] at any time of the day.            Follow-ups  after your visit        Additional Services     PHYSICAL THERAPY REFERRAL - EXTERNAL       [facility]  [address] [place] [address]  [phone]                  Who to contact     Please call your clinic at [phone] to:    Ask questions about your health    Make or cancel appointments    Discuss your medicines    Learn about your test results    Speak to your doctor   If you have compliments or concerns about an experience at your clinic, or if you wish to file a complaint, please contact [facility] Patient Relations at [phone] or email us at [email or web address]         Additional Information About Your Visit        [employer] Information     [employer] gives you secure access to your electronic health record. If you see a primary care provider, you can also send messages to your care team and make appointments. If you have questions, please call your primary care clinic.  If you do not have a primary care provider, please call [phone] and they will assist you.      [employer] is an electronic gateway that provides easy, online access to your medical records. With [employer], you can request a clinic appointment, read your test results, renew a prescription or communicate with your care team.     To access your existing account, please contact your [facility] or call [phone] for assistance.        Care EveryWhere ID     This is your Care EveryWhere ID. This could be used by other organizations to access your [facility] medical records  [id number]        Your Vitals Were     Pulse Temperature Pulse Oximetry BMI (Body Mass Index)          79 98.1  F (36.7  C) (Oral) 96% 24.78 kg/m2         Blood Pressure from Last 3 Encounters:   12/13/17 107/74   08/25/17 114/83   07/07/17 108/76    Weight from Last 3 Encounters:   12/13/17 163 lb (73.9 kg)   08/25/17 159 lb 3.2 oz (72.2 kg)   07/07/17 158 lb 3.2 oz (71.8 kg)               We Performed the Following     PHYSICAL THERAPY REFERRAL - EXTERNAL        Primary Care Provider Office Phone # Fax #    Mirza Sosa [phone]       [address] [facility] [address]        Equal Access to Services     JUSTUS MADRIGAL : Guadalupe hdez darcie Maddox, wagirishda luqadaha, qaybta kaalmada kayla, tomasz durbin laOrtizsima duarte. So [facility] [phone].    ATENCIÓN: Si habla español, tiene a warner disposición servicios gratuitos de asistencia lingüística. [facility] [phone].    We comply with applicable federal civil rights laws and [place] laws. We do not discriminate on the basis of race, color, national origin, age, disability, sex, sexual orientation, or gender identity.            Thank you!     Thank you for choosing [facility]  for your care. Our goal is always to provide you with excellent care. Hearing back from our patients is one way we can continue to improve our services. Please take a few minutes to complete the written survey that you may receive in the mail after your visit with us. Thank you!             Your Updated Medication List - Protect others around you: Learn how to safely use, store and throw away your medicines at www.disposemymeds.org.          This list is accurate as of: 12/13/17 10:51 AM.  Always use your most recent med list.                   Brand Name Dispense Instructions for use Diagnosis    acetaminophen 325 MG tablet    TYLENOL    100 tablet    Take 3 tablets (975 mg) by mouth every 8 hours    Lipoma of skin and subcutaneous tissue       ibuprofen 600 MG tablet    ADVIL/MOTRIN    90 tablet    Take 1 tablet (600 mg) by mouth every 6 hours as needed for moderate pain    Neck pain       PARoxetine 20 MG tablet    PAXIL    60 tablet    Take 1 tablet (20 mg) by mouth every morning    Premature ejaculation       valACYclovir 500 MG tablet    VALTREX    90 tablet    Take 1 tablet (500 mg) by mouth daily    Herpes  simplex virus (HSV) infection

## 2017-12-13 NOTE — PROGRESS NOTES
"      HPI:       Toribio Rooney is a 37 year old who presents for the following  Patient presents with:  Referral: Neurologist  Oral Swelling: Random tongue swelling and soreness for many years    Toribio presents for with a couple of concerns:  1) Tongue pain and swelling. 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 He reports he has not taken it for about 1 month now because he found he had episodes of pain while taking it although the frequency of outbreaks greatly declined. He is wondering if needs to re-start the medication    2)Neck muscle strain and tension: Toribio reports a long term history of achy pain in his neck and shoulder that is triggered by prolonged periods of sitting. He will then stretch his muscles with some relief of the tension. He states he has tried PT in the past but did not like his time there because \"they don't even work, they don't do their job, they just touch me once and that's it.\" He is requesting a neurology consult to manage the pain. Denies weakness or numbness in upper or lower extremity.    A [employer]  was used for  this visit.      Problem, Medication and Allergy Lists were reviewed and are current.  Patient is an established patient of this clinic.         Review of Systems:   Review of Systems   Constitutional: Negative for chills, diaphoresis, fatigue and fever.   HENT: Negative for mouth sores and sore throat.         Tongue pain and lesions when experiencing HSV outbreak. Not currently present   Eyes: Negative for visual disturbance.   Respiratory: Negative for cough and shortness of breath.    Cardiovascular: Negative for chest pain.   Gastrointestinal: Negative for abdominal pain, nausea and vomiting.   Musculoskeletal: Positive for myalgias (neck and shoulders) and neck pain.   Neurological: Negative for weakness, numbness and headaches.             Physical Exam:   Patient Vitals for the past 24 hrs:   BP " Temp Temp src Pulse SpO2 Weight   12/13/17 1004 107/74 98.1  F (36.7  C) Oral 79 96 % 163 lb (73.9 kg)     Body mass index is 24.78 kg/(m^2).  Vitals were reviewed and were normal     Physical Exam   Constitutional: He is oriented to person, place, and time. He appears well-developed and well-nourished. No distress.   Patient's is sitting with shoulders slouched over on bench   HENT:   Head: Normocephalic and atraumatic.   Musculoskeletal: Normal range of motion.        Right shoulder: Normal.        Left shoulder: Normal.   Neurological: He is alert and oriented to person, place, and time. He has normal strength. No sensory deficit.   Skin: He is not diaphoretic.   Psychiatric: He has a normal mood and affect. His behavior is normal. Judgment and thought content normal.       Results:     No new results  Assessment and Plan     1. Generalized muscle ache  No indications for neurology referral at this time. Recommend return to PT as patient only went for 1 session previously. Advised to perform stretching exercises home, practice good posture when sitting, can use heat packs or tylenol/ibuprofen for relief of pain. May also consider follow up with DO provider at [facility] for OMT which will be of benefit to patient.   - PHYSICAL THERAPY REFERRAL - EXTERNAL    2. Herpes simplex virus (HSV) infection  Recommended continued use of daily suppresive Valtrex as there has been improvement in # of outbreaks per patient reports.     There are no discontinued medications.  Options for treatment and follow-up care were reviewed with the patient. Toribio Rooney  engaged in the decision making process and verbalized understanding of the options discussed and agreed with the final plan.    Maureen Inman MD  Family Medicine PGY2

## 2017-12-13 NOTE — PATIENT INSTRUCTIONS
Here is the plan from today's visit    1. Generalized muscle ache  - PHYSICAL THERAPY REFERRAL - EXTERNAL      Please call or return to clinic if your symptoms don't go away.    Follow up plan as needed    Thank you for coming to [facility] today.  Lab Testing:  **If you had lab testing today and your results are reassuring or normal they will be mailed to you or sent through [employer] within 7 days.   **If the lab tests need quick action we will call you with the results.  The phone number we will call with results is # [phone] (home) . If this is not the best number please call our clinic and change the number.  Medication Refills:  If you need any refills please call your pharmacy and they will contact us.   If you need to  your refill at a new pharmacy, please contact the new pharmacy directly. The new pharmacy will help you get your medications transferred faster.   Scheduling:  If you have any concerns about today's visit or wish to schedule another appointment please call our office during normal business hours [phone] (8-5:00 M-F)  If a referral was made to a [facility] and you don't get a call from central scheduling please call [phone].  If a Mammogram was ordered for you at [facility] call [phone] to schedule or change your appointment.  If you had an XRay/CT/Ultrasound/MRI ordered the number is [phone] to schedule or change your radiology appointment.   Medical Concerns:  If you have urgent medical concerns please call [phone] at any time of the day.

## 2017-12-22 ASSESSMENT — ENCOUNTER SYMPTOMS
SHORTNESS OF BREATH: 0
VOMITING: 0
DIAPHORESIS: 0
ABDOMINAL PAIN: 0
SORE THROAT: 0
NAUSEA: 0
CHILLS: 0
COUGH: 0
NUMBNESS: 0
WEAKNESS: 0
FATIGUE: 0
NECK PAIN: 1
FEVER: 0
HEADACHES: 0
MYALGIAS: 1

## 2017-12-29 NOTE — PROGRESS NOTES
Preceptor Attestation:   Patient seen and discussed with the resident. Assessment and plan reviewed with resident and agreed upon.   Supervising Physician:  [facility]

## 2018-05-10 ENCOUNTER — OFFICE VISIT (OUTPATIENT)
Dept: FAMILY MEDICINE | Facility: CLINIC | Age: 38
End: 2018-05-10
Payer: COMMERCIAL

## 2018-05-10 VITALS
SYSTOLIC BLOOD PRESSURE: 104 MMHG | BODY MASS INDEX: 24.42 KG/M2 | DIASTOLIC BLOOD PRESSURE: 72 MMHG | WEIGHT: 160.6 LBS | OXYGEN SATURATION: 96 % | RESPIRATION RATE: 16 BRPM | TEMPERATURE: 97.5 F | HEART RATE: 69 BPM

## 2018-05-10 DIAGNOSIS — Z00.00 HEALTH CARE MAINTENANCE: Primary | ICD-10-CM

## 2018-05-10 LAB
CHOLEST SERPL-MCNC: 166.6 MG/DL (ref 0–200)
CHOLEST/HDLC SERPL: 5.2 {RATIO} (ref 0–5)
HDLC SERPL-MCNC: 32.3 MG/DL
LDLC SERPL CALC-MCNC: 111 MG/DL (ref 0–129)
TRIGL SERPL-MCNC: 118.4 MG/DL (ref 0–150)
VLDL CHOLESTEROL: 23.7 MG/DL (ref 7–32)

## 2018-05-10 NOTE — PROGRESS NOTES
HPI:       Toribio Rooney is a 38 year old who presents for the following  Patient presents with:  immunization titers    This is a 38 year old male without any significant past medical history who presented to the clinic today to have immunization titers and mantoux test done. He is getting a job with [employer] and was required to obtain immunization titers. He is doing well and in no acute distress. Patient denies fever, chills, headaches, blurry vision, chest pain, shortness of breath, abdominal pain, nausea, and vomiting.      Problem, Medication and Allergy Lists were   reviewed and are current.     Patient Active Problem List    Diagnosis Date Noted     Slow transit constipation 06/01/2017     Priority: Medium     Diet.        External hemorrhoids 01/07/2016     Priority: Medium     Lipoma of skin and subcutaneous tissue 09/29/2014     Priority: Medium     Herpes simplex virus (HSV) infection 09/29/2014     Priority: Medium     Problem list name updated by automated process. Provider to review           Current Outpatient Prescriptions   Medication Sig Dispense Refill     acetaminophen (TYLENOL) 325 MG tablet Take 3 tablets (975 mg) by mouth every 8 hours 100 tablet 0     ibuprofen (ADVIL,MOTRIN) 600 MG tablet Take 1 tablet (600 mg) by mouth every 6 hours as needed for moderate pain 90 tablet 1     PARoxetine (PAXIL) 20 MG tablet Take 1 tablet (20 mg) by mouth every morning 60 tablet 1     valACYclovir (VALTREX) 500 MG tablet Take 1 tablet (500 mg) by mouth daily (Patient not taking: Reported on 5/10/2018) 90 tablet 3       No Known Allergies  Patient is an established patient of this clinic.         Review of Systems:   Review of Systems 10 point review of system negative except as mentioned in HPI.           Physical Exam:   Patient Vitals for the past 24 hrs:   BP Temp Temp src Pulse Resp SpO2 Weight   05/10/18 0812 104/72 97.5  F (36.4  C) Oral 69 16 96 % 160 lb 9.6 oz (72.8 kg)     Body mass index  is 24.42 kg/(m^2).  Vitals were reviewed and were normal     Physical Exam   Constitutional: He appears well-developed and well-nourished.   HENT:   Head: Normocephalic.   Eyes: Conjunctivae are normal.   Cardiovascular: Normal rate and regular rhythm.    Pulmonary/Chest: Effort normal and breath sounds normal.   Skin: Skin is warm. No rash noted.   On exposed skin   Psychiatric: He has a normal mood and affect.       Results:      Results from the last 24 hoursNo results found for this or any previous visit (from the past 24 hour(s)).  Assessment and Plan     Toribio was seen today for immunization titers.    Diagnoses and all orders for this visit:    Health care maintenance  -     Rubeola Antibody IgG  -     Mumps Antibody IgG  -     Rubella Antibody IgG Quantitative  -     Hepatitis B Surface Antibody  -     Hepatitis B surface antigen  -     Varicella Zoster Virus Antibody IgG  -     M Tuberculosis by Quantiferon  -     Lipid [place] ([facility])      There are no discontinued medications.  Options for treatment and follow-up care were reviewed with the patient. Toribio Rooney  engaged in the decision making process and verbalized understanding of the options discussed and agreed with the final plan.    Lasha Jones MD  PGY3 [facility] Family Medicine Resident   Pager: [phone]

## 2018-05-10 NOTE — PROGRESS NOTES
Preceptor Attestation:   Patient seen, evaluated and discussed with the resident. I have verified the content of the note, which accurately reflects my assessment of the patient and the plan of care.   Supervising Physician:  Nga Vásquez MD

## 2018-05-10 NOTE — MR AVS SNAPSHOT
After Visit Summary   5/10/2018    Toribio Rooney    MRN: [id number]           Patient Information     Date Of Birth          1980        Visit Information        Provider Department      5/10/2018 8:00 AM Lasha Valiente MD [facility]        Today's [facility] maintenance    -  1       Follow-ups after your visit        Who to contact     Please call your clinic at [phone] to:    Ask questions about your health    Make or cancel appointments    Discuss your medicines    Learn about your test results    Speak to your doctor            Additional Information About Your Visit        MyChart Information     [employer] gives you secure access to your electronic health record. If you see a primary care provider, you can also send messages to your care team and make appointments. If you have questions, please call your primary care clinic.  If you do not have a primary care provider, please call [phone] and they will assist you.      [employer] is an electronic gateway that provides easy, online access to your medical records. With [employer], you can request a clinic appointment, read your test results, renew a prescription or communicate with your care team.     To access your existing account, please contact your [facility] or call [phone] for assistance.        Care EveryWhere ID     This is your Care EveryWhere ID. This could be used by other organizations to access your [place] medical records  [id number]        Your Vitals Were     Pulse Temperature Respirations Pulse Oximetry BMI (Body Mass Index)       69 97.5  F (36.4  C) (Oral) 16 96% 24.42 kg/m2        Blood Pressure from Last 3 Encounters:   05/10/18 104/72   12/13/17 107/74   08/25/17 114/83    Weight from Last 3 Encounters:   05/10/18 160 lb 9.6 oz (72.8 kg)   12/13/17 163 lb (73.9 kg)   08/25/17 159 lb 3.2 oz (72.2 kg)              We Performed the  Following     Hepatitis B Surface Antibody     Hepatitis B surface antigen     Lipid [place] ([place])     M Tuberculosis by Quantiferon     Mumps Antibody IgG     Rubella Antibody IgG Quantitative     Rubeola Antibody IgG     Varicella Zoster Virus Antibody IgG        Primary Care Provider Office Phone # Fax #    Mirza Sosa [phone]       [address] [facility] [address]        Equal Access to Services     [facility]: Hadii aad ku hadasho Soomaali, waaxda luqadaha, qaybta kaalmada adeegyada, waxay idiin hayaan adeeg khmarshallsh laOrtizaan . So [facility] [phone].    ATENCIÓN: Si habla español, tiene a warner disposición servicios gratuitos de asistencia lingüística. Suhail al [phone].    We comply with applicable federal civil rights laws and [place] laws. We do not discriminate on the basis of race, color, national origin, age, disability, sex, sexual orientation, or gender identity.            Thank you!     Thank you for choosing [facility]  for your care. Our goal is always to provide you with excellent care. Hearing back from our patients is one way we can continue to improve our services. Please take a few minutes to complete the written survey that you may receive in the mail after your visit with us. Thank you!             Your Updated Medication List - Protect others around you: Learn how to safely use, store and throw away your medicines at www.disposemymeds.org.          This list is accurate as of 5/10/18  3:44 PM.  Always use your most recent med list.                   Brand Name Dispense Instructions for use Diagnosis    acetaminophen 325 MG tablet    TYLENOL    100 tablet    Take 3 tablets (975 mg) by mouth every 8 hours    Lipoma of skin and subcutaneous tissue       ibuprofen 600 MG tablet    ADVIL/MOTRIN    90 tablet    Take 1 tablet (600 mg) by mouth every 6 hours as needed for moderate pain    Neck pain       PARoxetine 20 MG tablet    PAXIL     60 tablet    Take 1 tablet (20 mg) by mouth every morning    Premature ejaculation       valACYclovir 500 MG tablet    VALTREX    90 tablet    Take 1 tablet (500 mg) by mouth daily    Herpes simplex virus (HSV) infection

## 2018-05-11 LAB
HBV SURFACE AB SERPL IA-ACNC: 0.31 M[IU]/ML
HBV SURFACE AG SERPL QL IA: NONREACTIVE
MEV IGG SER QL IA: 2 AI (ref 0–0.8)
MUV IGG SER QL IA: 1.8 AI (ref 0–0.8)
RUBV IGG SERPL IA-ACNC: >250 IU/ML
VZV IGG SER QL IA: 1.6 AI (ref 0–0.8)

## 2018-05-14 LAB
M TB TUBERC IFN-G BLD QL: POSITIVE
M TB TUBERC IFN-G/MITOGEN IGNF BLD: 2.84 IU/ML

## 2018-08-10 DIAGNOSIS — B00.9 HERPES SIMPLEX VIRUS (HSV) INFECTION: ICD-10-CM

## 2018-08-10 RX ORDER — VALACYCLOVIR HYDROCHLORIDE 500 MG/1
500 TABLET, FILM COATED ORAL DAILY
Qty: 90 TABLET | Refills: 3 | Status: SHIPPED | OUTPATIENT
Start: 2018-08-10 | End: 2018-08-22

## 2018-08-22 ENCOUNTER — OFFICE VISIT (OUTPATIENT)
Dept: FAMILY MEDICINE | Facility: CLINIC | Age: 38
End: 2018-08-22
Payer: COMMERCIAL

## 2018-08-22 VITALS
HEART RATE: 67 BPM | BODY MASS INDEX: 23.4 KG/M2 | TEMPERATURE: 97.6 F | HEIGHT: 68 IN | WEIGHT: 154.4 LBS | DIASTOLIC BLOOD PRESSURE: 73 MMHG | RESPIRATION RATE: 16 BRPM | SYSTOLIC BLOOD PRESSURE: 102 MMHG | OXYGEN SATURATION: 97 %

## 2018-08-22 DIAGNOSIS — Z00.00 ROUTINE GENERAL MEDICAL EXAMINATION AT A HEALTH CARE FACILITY: Primary | ICD-10-CM

## 2018-08-22 DIAGNOSIS — D17.30 LIPOMA OF SKIN AND SUBCUTANEOUS TISSUE: ICD-10-CM

## 2018-08-22 DIAGNOSIS — B00.9 HERPES SIMPLEX VIRUS (HSV) INFECTION: ICD-10-CM

## 2018-08-22 PROBLEM — R76.12 POSITIVE QUANTIFERON-TB GOLD TEST: Status: ACTIVE | Noted: 2018-08-22

## 2018-08-22 RX ORDER — VALACYCLOVIR HYDROCHLORIDE 500 MG/1
500 TABLET, FILM COATED ORAL DAILY
Qty: 90 TABLET | Refills: 0 | Status: SHIPPED | OUTPATIENT
Start: 2018-08-22 | End: 2019-09-17

## 2018-08-22 RX ORDER — MULTIPLE VITAMINS W/ MINERALS TAB 9MG-400MCG
1 TAB ORAL DAILY
Qty: 100 TABLET | Refills: 3 | Status: SHIPPED | OUTPATIENT
Start: 2018-08-22 | End: 2020-05-12

## 2018-08-22 RX ORDER — ACETAMINOPHEN 325 MG/1
650 TABLET ORAL EVERY 8 HOURS
Qty: 100 TABLET | Refills: 0 | Status: SHIPPED | OUTPATIENT
Start: 2018-08-22 | End: 2019-05-23

## 2018-08-22 NOTE — PROGRESS NOTES
Male Physical Note          HPI         Concerns today: No special concerns today.  A [employer]  was used for  this visit.     Patient Active Problem List   Diagnosis     Lipoma of skin and subcutaneous tissue     Herpes simplex virus (HSV) infection     External hemorrhoids     Slow transit constipation       History reviewed. No pertinent past medical history.    Previous Medical Care      Family History   Problem Relation Age of Onset     Family history unknown: Yes              Review of Systems:     Review of Systems:  CONSTITUTIONAL: NEGATIVE for fever, chills, change in weight  INTEGUMENTARY/SKIN: NEGATIVE for worrisome rashes, moles or lesions  EYES: NEGATIVE for vision changes or irritation  ENT/MOUTH: NEGATIVE for ear, mouth and throat problems  RESP: NEGATIVE for significant cough or SOB  BREAST: NEGATIVE for masses, tenderness or discharge  CV: NEGATIVE for chest pain, palpitations or peripheral edema  GI: NEGATIVE for nausea, abdominal pain, heartburn, or change in bowel habits  : NEGATIVE for frequency, dysuria, or hematuria  MUSCULOSKELETAL: NEGATIVE for significant arthralgias or myalgia  NEURO: NEGATIVE for weakness, dizziness or paresthesias  ENDOCRINE: NEGATIVE for temperature intolerance, skin/hair changes  HEME/ALLERGY: NEGATIVE for bleeding problems  PSYCHIATRIC: NEGATIVE for changes in mood or affect  Sleep:   Do you snore most or the night (as reported by a family member)? No  Do you feel sleepy or extremely tired during most of the day? No             Social History     Social History     Social History     Marital status:      Spouse name: N/A     Number of children: N/A     Years of education: N/A     Occupational History     Not on file.     Social History Main Topics     Smoking status: Never Smoker     Smokeless tobacco: Never Used     Alcohol use No     Drug use: No     Sexual activity: Not Currently     Other Topics Concern     Not on file     Social History  "Narrative       Marital Status:  Who lives in your household? Wife and 2 children.     Has anyone hurt you physically, for example by pushing, hitting, slapping or kicking you or forcing you to have sex? Denies  Do you feel threatened or controlled by a partner, ex-partner or anyone in your life? Denies    Sexual Health     Sexual concerns: No   STI History: Neg      Recommended Screening     HIV screening:  Recommended and patient accepted testing.           Physical Exam:     Vitals: /73  Pulse 67  Temp 97.6  F (36.4  C) (Oral)  Resp 16  Ht 5' 8\" (172.7 cm)  Wt 154 lb 6.4 oz (70 kg)  SpO2 97%  BMI 23.48 kg/m2  BMI= Body mass index is 23.48 kg/(m^2).  GENERAL: healthy, alert and no distress  EYES: Eyes grossly normal to inspection, extraocular movements - intact, and PERRL  HENT: ear canals- normal; TMs- normal; Nose- normal; Mouth- no ulcers, no lesions  NECK: no tenderness, no adenopathy, no asymmetry, no masses, no stiffness; thyroid- normal to palpation  RESP: lungs clear to auscultation - no rales, no rhonchi, no wheezes  CV: regular rates and rhythm, normal S1 S2, no S3 or S4 and no murmur, no click or rub -  ABDOMEN: soft, no tenderness, no  hepatosplenomegaly, no masses, normal bowel sounds  MS: extremities- no gross deformities noted, no edema  SKIN: no suspicious lesions, no rashes  NEURO: strength and tone- normal, sensory exam- grossly normal, mentation- intact, speech- normal, reflexes- symmetric  BACK: no CVA tenderness, no paralumbar tenderness  - male: testicles- normal, no atrophy, no masses;  no inguinal hernias  PSYCH: Alert and oriented times 3; speech- coherent , normal rate and volume; able to articulate logical thoughts, able to abstract reason, no tangential thoughts, no hallucinations or delusions, affect- normal  LYMPHATICS: ant. cervical- normal, post. cervical- normal, axillary- normal, supraclavicular- normal, inguinal- normal    Assessment and Plan      Mohamed " was seen today for physical and refill request.    Diagnoses and all orders for this visit:    Routine general medical examination at a health care facility      PLAN:  1. Routine general medical examination at a health care facility  - multivitamin, therapeutic with minerals (MULTI-VITAMIN) TABS tablet; Take 1 tablet by mouth daily  Dispense: 100 tablet; Refill: 3  - HIV Antigen Antibody Combo    2. Herpes simplex virus (HSV) infection  Unsure whether patient has history of aphthous ulcers in mouth vs. HSV lesions given that his ulcers are inside the lip line and in the oral cavity. He denies any extra-oral or facial lesions. However, he has been treated in the past and upon discontinuation has seemed to re-activate or present again. At this time will refill however he should follow up with PCP for further continuation.    - valACYclovir (VALTREX) 500 MG tablet; Take 1 tablet (500 mg) by mouth daily  Dispense: 90 tablet; Refill: 0    3. Lipoma of skin and subcutaneous tissue  - acetaminophen (TYLENOL) 325 MG tablet; Take 2 tablets (650 mg) by mouth every 8 hours  Dispense: 100 tablet; Refill: 0    4. Recent + Quant gold TB test  He was lost to follow up previously after + results.   - NEEDS CHEST XRAY and possibly treat with INH for 9 months.     Please call or return to clinic if your symptoms don't go away.    Follow up plan  Please make a clinic appointment for follow up with your primary physician Mirza Sosa MD as needed.     Abram Martell MD    Options for treatment and follow-up care were reviewed with the patient. Toribio Rooney and/or guardian engaged in the decision making process and verbalized understanding of the options discussed and agreed with the final plan.    Abram Martell MD

## 2018-08-22 NOTE — PATIENT INSTRUCTIONS
Preventive Health Recommendations  Male Ages 26 - 39    Yearly exam:             See your health care provider every year in order to  o   Review health changes.   o   Discuss preventive care.    o   Review your medicines if your doctor has prescribed any.    You should be tested each year for STDs (sexually transmitted diseases), if you re at risk.     After age 35, talk to your provider about cholesterol testing. If you are at risk for heart disease, have your cholesterol tested at least every 5 years.     If you are at risk for diabetes, you should have a diabetes test (fasting glucose).  Shots: Get a flu shot each year. Get a tetanus shot every 10 years.     Nutrition:    Eat at least 5 servings of fruits and vegetables daily.     Eat whole-grain bread, whole-wheat pasta and brown rice instead of white grains and rice.     Get adequate Calcium and Vitamin D.     Lifestyle    Exercise for at least 150 minutes a week (30 minutes a day, 5 days a week). This will help you control your weight and prevent disease.     Limit alcohol to one drink per day.     No smoking.     Wear sunscreen to prevent skin cancer.     See your dentist every six months for an exam and cleaning.     Here is the plan from today's visit    1. Routine general medical examination at a health care facility  - multivitamin, therapeutic with minerals (MULTI-VITAMIN) TABS tablet; Take 1 tablet by mouth daily  Dispense: 100 tablet; Refill: 3  - HIV Antigen Antibody Combo    2. Herpes simplex virus (HSV) infection  - valACYclovir (VALTREX) 500 MG tablet; Take 1 tablet (500 mg) by mouth daily  Dispense: 90 tablet; Refill: 0    3. Lipoma of skin and subcutaneous tissue  - acetaminophen (TYLENOL) 325 MG tablet; Take 2 tablets (650 mg) by mouth every 8 hours  Dispense: 100 tablet; Refill: 0      Please call or return to clinic if your symptoms don't go away.    Follow up plan  Please make a clinic appointment for follow up with your primary physician  Mirza Sosa MD as needed.     Abram Martell MD

## 2018-08-22 NOTE — NURSING NOTE
Due to patient being non-English speaking/uses sign language, an  was used for this visit. Only for face-to-face interpretation by an external agency, date and length of interpretation can be found on the scanned worksheet.     name: Christian Moss  Agency: [employer]  Language: Vincentian   Telephone number:[phone]   Type of interpretation: Face-to-face, spoken     Peg Anand CMA

## 2018-08-22 NOTE — MR AVS SNAPSHOT
After Visit Summary   8/22/2018    Toribio Rooney    MRN: [id number]           Patient Information     Date Of Birth          1980        Visit Information        Provider Department      8/22/2018 1:20 PM Abram Martell MD [facility]        Today's Diagnoses     Routine general medical examination at a health care facility    -  1    Herpes simplex virus (HSV) infection        Lipoma of skin and subcutaneous tissue          Care Instructions      Preventive Health Recommendations  Male Ages 26 - 39    Yearly exam:             See your health care provider every year in order to  o   Review health changes.   o   Discuss preventive care.    o   Review your medicines if your doctor has prescribed any.    You should be tested each year for STDs (sexually transmitted diseases), if you re at risk.     After age 35, talk to your provider about cholesterol testing. If you are at risk for heart disease, have your cholesterol tested at least every 5 years.     If you are at risk for diabetes, you should have a diabetes test (fasting glucose).  Shots: Get a flu shot each year. Get a tetanus shot every 10 years.     Nutrition:    Eat at least 5 servings of fruits and vegetables daily.     Eat whole-grain bread, whole-wheat pasta and brown rice instead of white grains and rice.     Get adequate Calcium and Vitamin D.     Lifestyle    Exercise for at least 150 minutes a week (30 minutes a day, 5 days a week). This will help you control your weight and prevent disease.     Limit alcohol to one drink per day.     No smoking.     Wear sunscreen to prevent skin cancer.     See your dentist every six months for an exam and cleaning.             Follow-ups after your visit        Who to contact     Please call your clinic at [phone] to:    Ask questions about your health    Make or cancel appointments    Discuss your medicines    Learn about your test results    Speak to your doctor  "           Additional Information About Your Visit        [employer] Information     [employer] gives you secure access to your electronic health record. If you see a primary care provider, you can also send messages to your care team and make appointments. If you have questions, please call your primary care clinic.  If you do not have a primary care provider, please call [phone] and they will assist you.      [employer] is an electronic gateway that provides easy, online access to your medical records. With [employer], you can request a clinic appointment, read your test results, renew a prescription or communicate with your care team.     To access your existing account, please contact your [facility] or call [phone] for assistance.        Care EveryWhere ID     This is your Care EveryWhere ID. This could be used by other organizations to access your [place] medical records  [id number]        Your Vitals Were     Pulse Temperature Respirations Height Pulse Oximetry BMI (Body Mass Index)    67 97.6  F (36.4  C) (Oral) 16 5' 8\" (172.7 cm) 97% 23.48 kg/m2       Blood Pressure from Last 3 Encounters:   08/22/18 102/73   05/10/18 104/72   12/13/17 107/74    Weight from Last 3 Encounters:   08/22/18 154 lb 6.4 oz (70 kg)   05/10/18 160 lb 9.6 oz (72.8 kg)   12/13/17 163 lb (73.9 kg)              We Performed the Following     HIV Antigen Antibody Combo          Today's Medication Changes          These changes are accurate as of 8/22/18  2:07 PM.  If you have any questions, ask your nurse or doctor.               Start taking these medicines.        Dose/Directions    multivitamin, therapeutic with minerals Tabs tablet   Used for:  Routine general medical examination at a health care facility   Started by:  Abram Martell MD        Dose:  1 tablet   Take 1 tablet by mouth daily   Quantity:  100 tablet   Refills:  3         These medicines have changed or have updated prescriptions.  "       Dose/Directions    acetaminophen 325 MG tablet   Commonly known as:  TYLENOL   This may have changed:  how much to take   Used for:  Lipoma of skin and subcutaneous tissue   Changed by:  Abram Martell MD        Dose:  650 mg   Take 2 tablets (650 mg) by mouth every 8 hours   Quantity:  100 tablet   Refills:  0            Where to get your medicines      These medications were sent to [facility] [place], [place] - [address] [facility] [address]     Phone:  [phone]     acetaminophen 325 MG tablet         Some of these will need a paper prescription and others can be bought over the counter.  Ask your nurse if you have questions.     Bring a paper prescription for each of these medications     multivitamin, therapeutic with minerals Tabs tablet    valACYclovir 500 MG tablet                Primary Care Provider Office Phone # Fax #    Mirza Sosa [phone]       [address]  [facility] [place] [address]        Equal Access to Services     [facility] Center[facility]RISSY : Hadii augie hdez hadasho Soninfa, waaxda luqadaha, qaybta kaalmada adeegyada, tomasz khan haysima jones . So [facility] [phone].    ATENCIÓN: Si habla español, tiene a warner disposición servicios gratuitos de asistencia lingüística. [facility] [phone].    We comply with applicable federal civil rights laws and [place] laws. We do not discriminate on the basis of race, color, national origin, age, disability, sex, sexual orientation, or gender identity.            Thank you!     Thank you for choosing [facility]  for your care. Our goal is always to provide you with excellent care. Hearing back from our patients is one way we can continue to improve our services. Please take a few minutes to complete the written survey that you may receive in the mail after your visit with us. Thank you!             Your Updated Medication List - Protect others around you: Learn how  to safely use, store and throw away your medicines at www.disposemymeds.org.          This list is accurate as of 8/22/18  2:07 PM.  Always use your most recent med list.                   Brand Name Dispense Instructions for use Diagnosis    acetaminophen 325 MG tablet    TYLENOL    100 tablet    Take 2 tablets (650 mg) by mouth every 8 hours    Lipoma of skin and subcutaneous tissue       ibuprofen 600 MG tablet    ADVIL/MOTRIN    90 tablet    Take 1 tablet (600 mg) by mouth every 6 hours as needed for moderate pain    Neck pain       multivitamin, therapeutic with minerals Tabs tablet     100 tablet    Take 1 tablet by mouth daily    Routine general medical examination at a health care facility       PARoxetine 20 MG tablet    PAXIL    60 tablet    Take 1 tablet (20 mg) by mouth every morning    Premature ejaculation       valACYclovir 500 MG tablet    VALTREX    90 tablet    Take 1 tablet (500 mg) by mouth daily    Herpes simplex virus (HSV) infection

## 2018-08-23 ENCOUNTER — RADIANT APPOINTMENT (OUTPATIENT)
Dept: GENERAL RADIOLOGY | Facility: CLINIC | Age: 38
End: 2018-08-23
Attending: FAMILY MEDICINE
Payer: COMMERCIAL

## 2018-08-23 ENCOUNTER — OFFICE VISIT (OUTPATIENT)
Dept: FAMILY MEDICINE | Facility: CLINIC | Age: 38
End: 2018-08-23
Payer: COMMERCIAL

## 2018-08-23 VITALS
WEIGHT: 155.2 LBS | HEART RATE: 61 BPM | SYSTOLIC BLOOD PRESSURE: 100 MMHG | DIASTOLIC BLOOD PRESSURE: 68 MMHG | TEMPERATURE: 97.8 F | BODY MASS INDEX: 23.6 KG/M2 | OXYGEN SATURATION: 97 %

## 2018-08-23 DIAGNOSIS — R76.12 POSITIVE QUANTIFERON-TB GOLD TEST: ICD-10-CM

## 2018-08-23 DIAGNOSIS — Z22.7 LATENT TUBERCULOSIS BY BLOOD TEST: Primary | ICD-10-CM

## 2018-08-23 LAB — HIV 1+2 AB+HIV1 P24 AG SERPL QL IA: NONREACTIVE

## 2018-08-23 ASSESSMENT — PAIN SCALES - GENERAL: PAINLEVEL: NO PAIN (0)

## 2018-08-23 NOTE — PATIENT INSTRUCTIONS
Here is the plan from today's visit    1. 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   -5/2018 - + quant gold  -8/2018- Neg chest xray  -Travelling to [facility] in 2 days for 1.5 months  -Will f/u with clinical pharmacy upon return and start INH x 9 months.   - Clinical Pharmacy-[facility] INTERNAL REFERRAL      Please call or return to clinic if your symptoms don't go away.    Follow up plan  Please make a clinic appointment for follow up with your primary physician Mirza Sosa MD in 2 months.      Thank you for coming to [facility] today.  Lab Testing:  **If you had lab testing today and your results are reassuring or normal they will be mailed to you or sent through [employer] within 7 days.   **If the lab tests need quick action we will call you with the results.  The phone number we will call with results is # [phone] (home) . If this is not the best number please call our clinic and change the number.  Medication Refills:  If you need any refills please call your pharmacy and they will contact us.   If you need to  your refill at a new pharmacy, please contact the new pharmacy directly. The new pharmacy will help you get your medications transferred faster.   Scheduling:  If you have any concerns about today's visit or wish to schedule another appointment please call our office during normal business hours [phone] (8-5:00 M-F)  If a referral was made to a [facility] and you don't get a call from central scheduling please call [phone].  If a Mammogram was ordered for you at [facility] call [phone] to schedule or change your appointment.  If you had an XRay/CT/Ultrasound/MRI ordered the number is [phone] to schedule or change your radiology appointment.   Medical Concerns:  If you have urgent medical concerns please call [phone] at any time of the day.    Abram Martell MD

## 2018-08-23 NOTE — PROGRESS NOTES
HPI       Toribio Rooney is a 38 year old  who presents for No chief complaint on file.    Latent TB Follow Up Visit  HPI:   Treatment not initiated yet - underwent CXR today showing no evidence of active TB  Patient's concerns: None  Patient is travelling to [facility] in [facility] days and will follow up with Pharmacy upon return in 1.5 months to start therapy. He is looking forward to starting the INH x 9 month therapy.     Plan: Will f/u with clinical pharmacy in 1.5 - 2 months.       A [employer]  was used for  this visit.    +++++++      Problem, Medication and Allergy Lists were      Patient Active Problem List    Diagnosis Date Noted     Positive QuantiFERON-TB Gold test 08/22/2018     Priority: Medium     Slow transit constipation 06/01/2017     Priority: Medium     Diet.        External hemorrhoids 01/07/2016     Priority: Medium     Lipoma of skin and subcutaneous tissue 09/29/2014     Priority: Medium     Herpes simplex virus (HSV) infection 09/29/2014     Priority: Medium     Problem list name updated by automated process. Provider to review     ,     Current Outpatient Prescriptions   Medication Sig Dispense Refill     acetaminophen (TYLENOL) 325 MG tablet Take 2 tablets (650 mg) by mouth every 8 hours 100 tablet 0     ibuprofen (ADVIL,MOTRIN) 600 MG tablet Take 1 tablet (600 mg) by mouth every 6 hours as needed for moderate pain 90 tablet 1     multivitamin, therapeutic with minerals (MULTI-VITAMIN) TABS tablet Take 1 tablet by mouth daily 100 tablet 3     PARoxetine (PAXIL) 20 MG tablet Take 1 tablet (20 mg) by mouth every morning 60 tablet 1     valACYclovir (VALTREX) 500 MG tablet Take 1 tablet (500 mg) by mouth daily (Patient not taking: Reported on 8/23/2018) 90 tablet 0   ,   No Known Allergies.    Patient is   an established patient of this clinic.  History reviewed. No pertinent past medical history.  Family History   Problem Relation Age of Onset     Family history unknown: Yes     Social  History     Social History     Marital status:      Spouse name: N/A     Number of children: N/A     Years of education: N/A     Social History Main Topics     Smoking status: Never Smoker     Smokeless tobacco: Never Used     Alcohol use No     Drug use: No     Sexual activity: Not Currently     Other Topics Concern     None     Social History Narrative   .         Review of Systems:   Review of Systems  Skin: negative except as above  Respiratory: negative except as above  Cardiovascular: negative except as above  Gastrointestinal: negative except as above  Genitourinary: negative except as above  Musculoskeletal: negative except as above  Neurologic: negative except as above  Psychiatric: negative except as above  Hematologic/Lymphatic/Immunologic: negative except as above  Endocrine: negative except as above         Physical Exam:     Vitals:    08/23/18 1657   BP: 100/68   Pulse: 61   Temp: 97.8  F (36.6  C)   TempSrc: Oral   SpO2: 97%   Weight: 155 lb 3.2 oz (70.4 kg)     Body mass index is 23.6 kg/(m^2).  Vitals were reviewed and were normal     Physical Exam  Constitutional: Oriented to person, place, and time. Appears well-developed and well-nourished.   Cardiovascular: Normal rate, regular rhythm, normal heart sounds and intact distal pulses.    Pulmonary/Chest: Effort normal and breath sounds normal. No respiratory distress. No wheezes.   Neurological: Alert and oriented to person, place, and time.   Skin: Skin is warm and dry.   Psychiatric: Has a normal mood and affect. Behavior is normal.       Results:     CXR: no evidence of active TB     Assessment and Plan        1. 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   -5/2018 - (+) quant gold  -8/2018-  Neg chest xray  -Travelling to [facility] in 2 days for 1.5 months  -Will f/u with clinical pharmacy upon return and start INH x 9 months.   - Clinical [facility]acy[facility]NAL REFERR[facility]      Please call or return to clinic if your  symptoms don't go away.    Follow up plan  Please make a clinic appointment for follow up with your primary physician Mirza Sosa MD in 2 months.      Thank you for coming to [facility] today.       There are no discontinued medications.    Options for treatment and follow-up care were reviewed with the patient. Toribio Rooney  engaged in the decision making process and verbalized understanding of the options discussed and agreed with the final plan.    Abram Martell MD

## 2018-08-23 NOTE — MR AVS SNAPSHOT
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   -5/2018 - + quant gold  -8/2018- Neg chest xray  -Travelling to [facility] in 2 days for 1.5 months  -Will f/u with clinical pharmacy upon return and start INH x 9 months.   - Clinical Pharmacy-[facility] INTERNAL REFERRAL      Please call or return to clinic if your symptoms don't go away.    Follow up plan  Please make a clinic appointment for follow up with your primary physician Mirza Sosa MD in 2 months.      Thank you for coming to [facility] today.  Lab Testing:  **If you had lab testing today and your results are reassuring or normal they will be mailed to you or sent through [email or web address] within 7 days.   **If the lab tests need quick action we will call you with the results.  The phone number we will call with results is # [phone] (home) . If this is not the best number please call our clinic and change the number.  Medication Refills:  If you need any refills please call your pharmacy and they will contact us.   If you need to  your refill at a new pharmacy, please contact the new pharmacy directly. The new pharmacy will help you get your medications transferred faster.   Scheduling:  If you have any concerns about today's visit or wish to schedule another appointment please call our office during normal business hours [phone] (8-5:00 M-F)  If a referral was made to a [facility] and you don't get a call from central scheduling please call [phone].  If a Mammogram was ordered for you at  [facility] call [phone] to schedule or change your appointment.  If you had an XRay/CT/Ultrasound/MRI ordered the number is [phone] to schedule or change your radiology appointment.   Medical Concerns:  If you have urgent medical concerns please call [phone] at any time of the day.    Abram Martell MD            Follow-ups after your visit        Additional Services     Clinical Pharmacy-[facility] INTERNAL REFERRAL       Reason: Latent TB treatment   Urgency of Appointment: Will travel to [facility] in 2 days and will start treatment upon return in 1.5 months.   Is this for a one time consult or how many independent pharmacy visits should the patient have before having a follow up provider appointment? Monitor TB regimen.                  Follow-up notes from your care team     Return in about 2 months (around 10/23/2018) for start INH therapy.      Who to contact     Please call your clinic at [phone] to:    Ask questions about your health    Make or cancel appointments    Discuss your medicines    Learn about your test results    Speak to your doctor            Additional Information About Your Visit        test company Information     test company gives you secure access to your electronic health record. If you see a primary care provider, you can also send messages to your care team and make appointments. If you have questions, please call your primary care clinic.  If you do not have a primary care provider, please call [phone] and they will assist you.      test company is an electronic gateway that provides easy, online access to your medical records. With test company, you can request a clinic appointment, read your test results, renew a prescription or communicate with your care team.     To access your existing account, please contact your [facility] or call [phone] for assistance.        Care EveryWhere ID     This is your Care EveryWhere ID. This could be used  by other organizations to access your [facility] medical records  [id number]        Your Vitals Were     Pulse Temperature Pulse Oximetry BMI (Body Mass Index)          61 97.8  F (36.6  C) (Oral) 97% 23.6 kg/m2         Blood Pressure from Last 3 Encounters:   08/23/18 100/68   08/22/18 102/73   05/10/18 104/72    Weight from Last 3 Encounters:   08/23/18 155 lb 3.2 oz (70.4 kg)   08/22/18 154 lb 6.4 oz (70 kg)   05/10/18 160 lb 9.6 oz (72.8 kg)              We Performed the Following     Clinical Pharmacy-[facility] INTERNAL REFERRAL        Primary Care Provider Office Phone # Fax #    Mirza Sosa [phone]       [address] [facility] [address]        Equal Access to Services     JUSTUS MADRIGAL : Hadii aad ku hadasho Soninfa, waaxda luqadaha, qaybta kaalmada adedangyaladonna, tomasz jones . So [facility] [place] [phone].    ATENCIÓN: Si habla español, tiene a warner disposición servicios gratuitos de asistencia lingüística. [facility] [phone].    We comply with applicable federal civil rights laws and [place] laws. We do not discriminate on the basis of race, color, national origin, age, disability, sex, sexual orientation, or gender identity.            Thank you!     Thank you for choosing [facility]  for your care. Our goal is always to provide you with excellent care. Hearing back from our patients is one way we can continue to improve our services. Please take a few minutes to complete the written survey that you may receive in the mail after your visit with us. Thank you!             Your Updated Medication List - Protect others around you: Learn how to safely use, store and throw away your medicines at www.disposemymeds.org.          This list is accurate as of 8/23/18 11:59 PM.  Always use your most recent med list.                   Brand Name Dispense Instructions for use Diagnosis    acetaminophen 325 MG tablet    TYLENOL    100 tablet    Take 2  tablets (650 mg) by mouth every 8 hours    Lipoma of skin and subcutaneous tissue       ibuprofen 600 MG tablet    ADVIL/MOTRIN    90 tablet    Take 1 tablet (600 mg) by mouth every 6 hours as needed for moderate pain    Neck pain       multivitamin, therapeutic with minerals Tabs tablet     100 tablet    Take 1 tablet by mouth daily    Routine general medical examination at a health care facility       PARoxetine 20 MG tablet    PAXIL    60 tablet    Take 1 tablet (20 mg) by mouth every morning    Premature ejaculation       valACYclovir 500 MG tablet    VALTREX    90 tablet    Take 1 tablet (500 mg) by mouth daily    Herpes simplex virus (HSV) infection

## 2018-08-23 NOTE — PROGRESS NOTES
Preceptor Attestation:   Patient seen, evaluated and discussed with the resident. I have verified the content of the note, which accurately reflects my assessment of the patient and the plan of care.   Supervising Physician:  Radha Luna MD

## 2018-09-04 ENCOUNTER — TELEPHONE (OUTPATIENT)
Dept: FAMILY MEDICINE | Facility: CLINIC | Age: 38
End: 2018-09-04

## 2018-09-04 NOTE — TELEPHONE ENCOUNTER
[facility] phone call message- general phone call:    Reason for call: Schedule appointment for TB follow up plan    Action Desired:  Patient is out of [place] country until mid October.  Call back to schedule  Return call needed: Yes    OK to leave a message on voice mail? Yes    Advised patient response may take up to 2 business days: Yes    Primary language: Czech      needed? No    Call taken on September 4, 2018 at 3:34 PM by Sammie Whittaker to [facility] TRIAGE

## 2018-11-13 ENCOUNTER — CARE COORDINATION (OUTPATIENT)
Dept: FAMILY MEDICINE | Facility: CLINIC | Age: 38
End: 2018-11-13

## 2018-11-15 ENCOUNTER — OFFICE VISIT (OUTPATIENT)
Dept: FAMILY MEDICINE | Facility: CLINIC | Age: 38
End: 2018-11-15
Payer: COMMERCIAL

## 2018-11-15 VITALS
RESPIRATION RATE: 16 BRPM | DIASTOLIC BLOOD PRESSURE: 75 MMHG | OXYGEN SATURATION: 100 % | HEART RATE: 84 BPM | BODY MASS INDEX: 23.57 KG/M2 | TEMPERATURE: 97.6 F | SYSTOLIC BLOOD PRESSURE: 108 MMHG | WEIGHT: 155 LBS

## 2018-11-15 DIAGNOSIS — M79.10 MYALGIA: ICD-10-CM

## 2018-11-15 DIAGNOSIS — Z22.7 LATENT TUBERCULOSIS BY BLOOD TEST: ICD-10-CM

## 2018-11-15 DIAGNOSIS — Z22.7 LATENT TUBERCULOSIS DIAGNOSED BY BLOOD TEST: Primary | ICD-10-CM

## 2018-11-15 RX ORDER — ISONIAZID 300 MG/1
300 TABLET ORAL DAILY
Qty: 90 TABLET | Refills: 2 | Status: SHIPPED | OUTPATIENT
Start: 2018-11-15 | End: 2019-07-12

## 2018-11-15 RX ORDER — LANOLIN ALCOHOL/MO/W.PET/CERES
50 CREAM (GRAM) TOPICAL DAILY
Qty: 90 TABLET | Refills: 3 | Status: SHIPPED | OUTPATIENT
Start: 2018-11-15 | End: 2020-05-12

## 2018-11-15 ASSESSMENT — ENCOUNTER SYMPTOMS
CHILLS: 0
MYALGIAS: 1
WHEEZING: 0
NECK PAIN: 1
BACK PAIN: 0
ADENOPATHY: 0
JOINT SWELLING: 0
CHEST TIGHTNESS: 0
DIAPHORESIS: 0
ARTHRALGIAS: 0
EYES NEGATIVE: 1
COUGH: 0
UNEXPECTED WEIGHT CHANGE: 0
SHORTNESS OF BREATH: 0
FEVER: 0
FATIGUE: 0

## 2018-11-15 NOTE — PROGRESS NOTES
HPI       Toribio Rooney is a 38 year old  who presents for   Chief Complaint   Patient presents with     Clinic Care Coordination - Follow-up     multivitamin     Musculoskeletal Problem     x's 2 months of right side neck/shoulders. Muscles feel tight and sore      +PPD, Tuberculin Skin Test or Positive Quantiferon Gold Test    Patient had positive quant gold test last May. Subsequent normal CXR last August. Treatment was discussed at that time but delayed as patient was about to go on a trip to [facility] to visit family. He recently returned at the end of October. He has been feeling well and denies any recent illness. No cough, hemoptysis, weight loss, fever/chills or night sweats. He does have neck and shoulder muscle pain which is chronic in nature and previously resolved with massage therapy. No other complaints or concerns at this time.    Quantiferon Gold Result : Positive  HPI:   Foreign born in country with endemic TB? Yes Details: [place]  Household contact with active TB? no  Immunosuppression (cancer, medications or HIV)? no   Cough:  no  Hemoptesis: no  Fever:  no  Sweats and chills:  no  Anorexia:  no  Weight loss: no  Malaise:  no  Pertinent PMH:  H/O cancer: no  Respiratory Disease: no  Renal disease: no  Liver Disease:no    Chest XRAY Findings : Normal    Plan: Isoniazid 300 mg daily X 9 months (preferred)    Follow Up: In one month. (increased adherence with monthly visit)    A Mexican  was used for  this visit.        Problem, Medication and Allergy Lists were reviewed and updated if needed..    Patient is an established patient of this clinic..          Review of Systems:   Review of Systems   Constitutional: Negative for chills, diaphoresis, fatigue, fever and unexpected weight change.   HENT: Negative.    Eyes: Negative.    Respiratory: Negative for cough, chest tightness, shortness of breath and wheezing.    Cardiovascular: Negative for chest pain.   Endocrine: Negative for  cold intolerance and heat intolerance.   Genitourinary: Negative.    Musculoskeletal: Positive for myalgias and neck pain. Negative for arthralgias, back pain and joint swelling.   Allergic/Immunologic: Negative for immunocompromised state.   Hematological: Negative for adenopathy.          Physical Exam:     Vitals:    11/15/18 0934   BP: 108/75   Pulse: 84   Resp: 16   Temp: 97.6  F (36.4  C)   TempSrc: Oral   SpO2: 100%   Weight: 155 lb (70.3 kg)     Body mass index is 23.57 kg/(m^2).  Vitals were reviewed and were normal     Physical Exam   Constitutional: He is oriented to person, place, and time. He appears well-developed and well-nourished.   HENT:   Mouth/Throat: Oropharynx is clear and moist. No oropharyngeal exudate.   Cardiovascular: Normal rate, regular rhythm and normal heart sounds.    No murmur heard.  Pulmonary/Chest: Breath sounds normal. He has no wheezes.   Lymphadenopathy:     He has no cervical adenopathy.   Neurological: He is alert and oriented to person, place, and time.         Results:   Results from last visit:    QUANT GOLD 5/10/18: POSITIVE    Office Visit on 08/22/2018   Component Date Value Ref Range Status     HIV Antigen Antibody Combo 08/22/2018 Nonreactive  NR^Nonreactive     Final    HIV-1 p24 Ag & HIV-1/HIV-2 Ab Not Detected     CXR 8/23/18: 1. Normal chest x-ray without evidence of healed or acute infection..      Assessment and Plan      1. Latent tuberculosis diagnosed by blood test- had positive quant gold on 5/10/18 followed by negative CXR on 8/23/18 consistent with latent TB infection. Treatment delayed previously as patient went to [facility] to visit family. He is now ready to begin treatment. Has been feeling well with no cough, hemoptysis, night sweats, fever/chills, or weight loss; being asymptomatic with recent cxr, no need to repeat imaging at this time. Discussed treatment options including INH and Rifampin. He is from [facility] where 39% of TB strains are resistant to  Rifampin. Will thus treat with Isoniazid 300mg PO for 9 months. HIV Ag/AB previously nonreactive. Will also give prescription for Vit B6. He will need to follow up in 1 month to reassess how things are going and to address any questions, concerns, or side effects that he may be having at that time.     - isoniazid (NYDRAZID) 300 MG tablet; Take 1 tablet (300 mg) by mouth daily  Dispense: 90 tablet; Refill: 2  - pyridoxine (VITAMIN B-6) 50 MG TABS; Take 1 tablet (50 mg) by mouth daily  Dispense: 90 tablet; Refill: 3    2. Myalgia- Patient has chronic diffuse muscle aches. Previously had massage therapy which helped. Interested in massage therapy again at this time. Referral made to PT.   - PHYSICAL THERAPY REFERRAL       There are no discontinued medications.    Options for treatment and follow-up care were reviewed with the patient. Toribio Rooney  engaged in the decision making process and verbalized understanding of the options discussed and agreed with the final plan.    Uvaldo Villegas, MS3  [facility]     I was present with the medical student who participated in the service and in the documentation of this note. I have verified the history and personally performed the physical exam and medical decision making, and have verified the content of the note, which accurately reflects my assessment of the patient and the plan of care.    MD Varghese Odom MD

## 2018-11-15 NOTE — NURSING NOTE
Due to patient being non-English speaking/uses sign language, an  was used for this visit. Only for face-to-face interpretation by an external agency, date and length of interpretation can be found on the scanned worksheet.     name: [id number]  Agency: [employer]  Language: Nigerian   Telephone number: --  Type of interpretation: Ipad  used    Peg Anand CMA

## 2018-11-15 NOTE — PATIENT INSTRUCTIONS
Here is the plan from today's visit    1. Latent tuberculosis diagnosed by blood test  - isoniazid (NYDRAZID) 300 MG tablet; Take 1 tablet (300 mg) by mouth daily  Dispense: 90 tablet; Refill: 2  - pyridoxine (VITAMIN B-6) 50 MG TABS; Take 1 tablet (50 mg) by mouth daily  Dispense: 90 tablet; Refill: 3    2. Muscle Aches  - PHYSICAL THERAPY REFERRAL      Please call or return to clinic if your symptoms don't go away.    Follow up plan  Please make a clinic appointment for follow up with me (VARGHESE CONTI) in 1 month    Thank you for coming to [facility] today.  Lab Testing:  **If you had lab testing today and your results are reassuring or normal they will be mailed to you or sent through [employer] within 7 days.   **If the lab tests need quick action we will call you with the results.  The phone number we will call with results is # [phone] (home) . If this is not the best number please call our clinic and change the number.  Medication Refills:  If you need any refills please call your pharmacy and they will contact us.   If you need to  your refill at a new pharmacy, please contact the new pharmacy directly. The new pharmacy will help you get your medications transferred faster.   Scheduling:  If you have any concerns about today's visit or wish to schedule another appointment please call our office during normal business hours [phone] (8-5:00 M-F)  If a referral was made to a [facility] and you don't get a call from central scheduling please call [phone].  If a Mammogram was ordered for you at [facility] call [phone] to schedule or change your appointment.  If you had an XRay/CT/Ultrasound/MRI ordered the number is [phone] to schedule or change your radiology appointment.   Medical Concerns:  If you have urgent medical concerns please call [phone] at any time of the day.    Varghese Cnoti MD

## 2018-11-15 NOTE — MR AVS SNAPSHOT
After Visit Summary   11/15/2018    Toribio Rooney    MRN: [id number]           Patient Information     Date Of Birth          1980        Visit Information        Provider Department      11/15/2018 9:00 AM Varghese Conti MD [facility]        Today's Diagnoses     Latent tuberculosis diagnosed by blood test    -  1    Myalgia          Care Instructions    Here is the plan from today's visit    1. Latent tuberculosis diagnosed by blood test  - isoniazid (NYDRAZID) 300 MG tablet; Take 1 tablet (300 mg) by mouth daily  Dispense: 90 tablet; Refill: 2  - pyridoxine (VITAMIN B-6) 50 MG TABS; Take 1 tablet (50 mg) by mouth daily  Dispense: 90 tablet; Refill: 3    2. Muscle Aches  - PHYSICAL THERAPY REFERRAL      Please call or return to clinic if your symptoms don't go away.    Follow up plan  Please make a clinic appointment for follow up with me (VARGHESE CONTI) in 1 month    Thank you for coming to [facility] today.  Lab Testing:  **If you had lab testing today and your results are reassuring or normal they will be mailed to you or sent through [employer] within 7 days.   **If the lab tests need quick action we will call you with the results.  The phone number we will call with results is # [phone] (home) . If this is not the best number please call our clinic and change the number.  Medication Refills:  If you need any refills please call your pharmacy and they will contact us.   If you need to  your refill at a new pharmacy, please contact the new pharmacy directly. The new pharmacy will help you get your medications transferred faster.   Scheduling:  If you have any concerns about today's visit or wish to schedule another appointment please call our office during normal business hours [phone] (8-5:00 M-F)  If a referral was made to a [facility] and you don't get a call from central scheduling please call  [phone].  If a Mammogram was ordered for you at [facility] call [phone] to schedule or change your appointment.  If you had an XRay/CT/Ultrasound/MRI ordered the number is [phone] to schedule or change your radiology appointment.   Medical Concerns:  If you have urgent medical concerns please call [phone] at any time of the day.    Varghese Hernández MD            Follow-ups after your visit        Additional Services     PHYSICAL THERAPY REFERRAL       Massage Therapy for diffuse muscle pain, especially neck and back  Please be aware that coverage of these services is subject to the terms and limitations of your health insurance plan.  Call member services at your health plan with any benefit or coverage questions.                  Who to contact     Please call your clinic at [phone] to:    Ask questions about your health    Make or cancel appointments    Discuss your medicines    Learn about your test results    Speak to your doctor            Additional Information About Your Visit        [email or web address] Information     [employer] gives you secure access to your electronic health record. If you see a primary care provider, you can also send messages to your care team and make appointments. If you have questions, please call your primary care clinic.  If you do not have a primary care provider, please call [phone] and they will assist you.      [employer] is an electronic gateway that provides easy, online access to your medical records. With [employer], you can request a clinic appointment, read your test results, renew a prescription or communicate with your care team.     To access your existing account, please contact your [facility] or call [phone] for assistance.        Care EveryWhere ID     This is your Care EveryWhere ID. This could be used by other organizations to access your [facility] medical records  [id number]        Your Vitals Were     Pulse  Temperature Respirations Pulse Oximetry BMI (Body Mass Index)       84 97.6  F (36.4  C) (Oral) 16 100% 23.57 kg/m2        Blood Pressure from Last 3 Encounters:   11/15/18 108/75   08/23/18 100/68   08/22/18 102/73    Weight from Last 3 Encounters:   11/15/18 155 lb (70.3 kg)   08/23/18 155 lb 3.2 oz (70.4 kg)   08/22/18 154 lb 6.4 oz (70 kg)              We Performed the Following     PHYSICAL THERAPY REFERRAL          Today's Medication Changes          These changes are accurate as of 11/15/18 10:21 AM.  If you have any questions, ask your nurse or doctor.               Start taking these medicines.        Dose/Directions    isoniazid 300 MG tablet   Commonly known as:  NYDRAZID   Used for:  Latent tuberculosis diagnosed by blood test   Started by:  Varghese Hernández MD        Dose:  300 mg   Take 1 tablet (300 mg) by mouth daily   Quantity:  90 tablet   Refills:  2       pyridoxine 50 MG Tabs   Commonly known as:  VITAMIN B-6   Used for:  Latent tuberculosis diagnosed by blood test   Started by:  Varghese Hernández MD        Dose:  50 mg   Take 1 tablet (50 mg) by mouth daily   Quantity:  90 tablet   Refills:  3            Where to get your medicines      These medications were sent to [facility] [place], [place] - [address] [facility]  [address] [facility] [address]     Phone:  [phone]     isoniazid 300 MG tablet    pyridoxine 50 MG Tabs                Primary Care Provider Office Phone # Fax #    Mirza Sosa [phone] [address] [facility] [address]        Equal Access to Services     [facility] AH: Hadii augie hdez hadasho Soomaali, waaxda luqadaha, qaybta kaalmada adeegyaladonna, tomasz duarte. So [facility] [phone].    ATENCIÓN: Si habla español, tiene a warner disposición servicios gratuitos de asistencia lingüística. Llame al [phone].    We comply with applicable federal civil rights laws and [place] laws. We do not  discriminate on the basis of race, color, national origin, age, disability, sex, sexual orientation, or gender identity.            Thank you!     Thank you for choosing \[facility]\"" [facility]  for your care. Our goal is always to provide you with excellent care. Hearing back from our patients is one way we can continue to improve our services. Please take a few minutes to complete the written survey that you may receive in the mail after your visit with us. Thank you!             Your Updated Medication List - Protect others around you: Learn how to safely use, store and throw away your medicines at www.disposemymeds.org.          This list is accurate as of 11/15/18 10:21 AM.  Always use your most recent med list.                   Brand Name Dispense Instructions for use Diagnosis    acetaminophen 325 MG tablet    TYLENOL    100 tablet    Take 2 tablets (650 mg) by mouth every 8 hours    Lipoma of skin and subcutaneous tissue       ibuprofen 600 MG tablet    ADVIL/MOTRIN    90 tablet    Take 1 tablet (600 mg) by mouth every 6 hours as needed for moderate pain    Neck pain       isoniazid 300 MG tablet    NYDRAZID    90 tablet    Take 1 tablet (300 mg) by mouth daily    Latent tuberculosis diagnosed by blood test       multivitamin, therapeutic with minerals Tabs tablet     100 tablet    Take 1 tablet by mouth daily    Routine general medical examination at a health care facility       PARoxetine 20 MG tablet    PAXIL    60 tablet    Take 1 tablet (20 mg) by mouth every morning    Premature ejaculation       pyridoxine 50 MG Tabs    VITAMIN B-6    90 tablet    Take 1 tablet (50 mg) by mouth daily    Latent tuberculosis diagnosed by blood test       valACYclovir 500 MG tablet    VALTREX    90 tablet    Take 1 tablet (500 mg) by mouth daily    Herpes simplex virus (HSV) infection

## 2018-11-15 NOTE — Clinical Note
Pharmacy, fyi.  Would recommend follow up with provider or pharm since initiating latent tb treatment

## 2018-11-15 NOTE — PROGRESS NOTES
Preceptor Attestation:   Patient seen, evaluated and discussed with the resident. I have verified the content of the note, which accurately reflects my assessment of the patient and the plan of care.   Supervising Physician:  Moiz Tidwell MD

## 2018-11-21 ENCOUNTER — HOSPITAL ENCOUNTER (EMERGENCY)
Facility: CLINIC | Age: 38
Discharge: HOME | End: 2018-11-21
Attending: EMERGENCY MEDICINE | Admitting: EMERGENCY MEDICINE
Payer: COMMERCIAL

## 2018-11-21 VITALS
RESPIRATION RATE: 16 BRPM | SYSTOLIC BLOOD PRESSURE: 146 MMHG | TEMPERATURE: 97.9 F | HEART RATE: 82 BPM | DIASTOLIC BLOOD PRESSURE: 88 MMHG | OXYGEN SATURATION: 97 %

## 2018-11-21 DIAGNOSIS — K12.0 APHTHOUS ULCER: ICD-10-CM

## 2018-11-21 PROBLEM — Z22.7 LATENT TUBERCULOSIS BY BLOOD TEST: Status: ACTIVE | Noted: 2018-11-21

## 2018-11-21 PROCEDURE — 99282 EMERGENCY DEPT VISIT SF MDM: CPT

## 2018-11-21 ASSESSMENT — ENCOUNTER SYMPTOMS: SORE THROAT: 0

## 2018-11-21 NOTE — ED AVS SNAPSHOT
[facility] Emergency Department    [address] [place] [address]    Phone:  [phone]    Fax:  [phone]                                       Tanvi Tinajero   MRN: [id number]    Department:  [facility] Emergency Department   Date of Visit:  11/21/2018           Patient Information     Date Of Birth          1980        Your diagnoses for this visit were:     Aphthous ulcer        You were seen by Chung Wharton MD.      Follow-up Information     Follow up with No Ref-Primary, Physician.    Why:  As needed with your primary care doctor        Discharge Instructions         Canker Sore    A canker sore (also called an aphthous ulcer) is a painful sore on the lining of the mouth. It is most painful during the first few days, and it lasts about 7 to 14 days before going away.  Causes  Canker sores are not cold sores or fever blisters. They are not contagious, so they are not spread by contact. The exact cause of canker sores is not clear, but there are a number of things that can trigger them in different people.    Mild injury, such as biting the inside of the mouth, lip, or cheek, or dental procedures    Stress    Poor diet, or lack of certain nutrients, including B vitamins and iron    Foods that can irritate the mouth, including tomatoes, citrus fruits, and some nuts (foods that are acidic or contain bitter substances called tannins)    Irritating chemicals, such as those in some toothpastes and mouthwashes    Certain chronic illnesses  Symptoms  Canker sores are found on the lining of the mouth. They can be inside the cheeks or lips, on the roof of the mouth, at the base of the gums, on the tongue, or in the back of the throat. Canker sores typically have these characteristics:    Small, flat (not raised) sores    Can be white or yellowish bumps that are red around the edges or have a red halo    Usually small in size, roundish, and in  groups    Accompanied by pain or burning  Canker sores don't leave a scar. But they usually come back.  Home care  The goals of canker sore treatment are to decrease the pain, speed healing, and prevent sores from coming back. No single treatment works for everyone. Try a number of methods to see what works best.  General care    You may find that soft, easy-to-chew foods cause less pain. Use a straw to direct liquids away from the sore.    Use a soft-bristle toothbrush, and brush your teeth gently.    Don t eat acidic, salty, or spicy foods.    Don t eat crusty or crunchy foods such as French bread or potato chips. These kinds of foods can hurt the inside of your mouth, or scrape your existing canker sores.  Medicines  You can try over-the-counter medicines that cover the sores and numb them. This protects the sores while they heal and helps reduce pain.  Homemade rinses and solutions  You can use these solutions as mouth rinses. Spit them out after using them. You can also dab them on the sores. You can repeat these treatments as often as needed.    Rinse your mouth with saltwater.    Mix equal amounts of hydrogen peroxide and water. You can use this as a mouthwash or dab it on spots with a cotton swab. You can also add sodium bicarbonate to this to make a paste, and then dab it on spots.  Follow-up care  Follow up with your healthcare provider, or as advised.    If a culture was done, you will be notified if the treatment needs to be changed. You can call as directed for the results.  Call 911  Call 911 if any of these occur:    Trouble breathing    Inability to swallow    Extreme drowsiness or trouble waking up    Fainting or loss of consciousness    Rapid heart rate    Seizure    Stiff neck  When to seek medical advice  Call your healthcare provider right away if any of these occur:    You have a fever of 100.4 F (38 C) or higher, or as directed by your provider    You are pregnant    You just had surgery or  another medical procedure, or you were just discharged from the hospital    It's too painful to eat or swallow  Date Last Reviewed: 10/1/2017    3853-3477 [employer]. [address] [place] [address], [place] [address]. All rights reserved. This information is not intended as a substitute for professional medical care. Always follow your healthcare professional's instructions.          24 Hour Appointment Hotline       To make an appointment at any [facility], call [phone] ([phone]). If you don't have a family doctor or clinic, we will help you find one. [facility] are conveniently located to serve the needs of you and your family.             Review of your medicines      START taking        Dose / Directions Last dose taken    Sodium Fluoride 0.243 % Pste   Dose:  1 Application   Quantity:  1 Tube        Take 1 Application by mouth 4 times daily as needed   Refills:  0                Prescriptions were sent or printed at these locations (1 Prescription)                   Other Prescriptions                Printed at Department/Unit printer (1 of 1)         Sodium Fluoride 0.243 % PSTE                Orders Needing Specimen Collection     None      Pending Results     No orders found from 11/19/2018 to 11/22/2018.            Pending Culture Results     No orders found from 11/19/2018 to 11/22/2018.            Pending Results Instructions     If you had any lab results that were not finalized at the time of your Discharge, you can call the ED Lab Result RN at [phone]. You will be contacted by this team for any positive Lab results or changes in treatment. The nurses are available 7 days a week from 10A to 6:30P.  You can leave a message 24 hours per day and they will return your call.        Test Results From Your Hospital Stay               Clinical Quality Measure: Blood Pressure Screening     Your blood pressure was checked while you were in the emergency department today. The  "last reading we obtained was  BP: 146/88 . Please read the guidelines below about what these numbers mean and what you should do about them.  If your systolic blood pressure (the top number) is less than 120 and your diastolic blood pressure (the bottom number) is less than 80, then your blood pressure is normal. There is nothing more that you need to do about it.  If your systolic blood pressure (the top number) is 120-139 or your diastolic blood pressure (the bottom number) is 80-89, your blood pressure may be higher than it should be. You should have your blood pressure rechecked within a year by a primary care provider.  If your systolic blood pressure (the top number) is 140 or greater or your diastolic blood pressure (the bottom number) is 90 or greater, you may have high blood pressure. High blood pressure is treatable, but if left untreated over time it can put you at risk for heart attack, stroke, or kidney failure. You should have your blood pressure rechecked by a primary care provider within the next 4 weeks.  If your provider in the emergency department today gave you specific instructions to follow-up with your doctor or provider even sooner than that, you should follow that instruction and not wait for up to 4 weeks for your follow-up visit.        Thank you for choosing [facility]       Thank you for choosing [facility] for your care. Our goal is always to provide you with excellent care. Hearing back from our patients is one way we can continue to improve our services. Please take a few minutes to complete the written survey that you may receive in the mail after you visit with us. Thank you!        [facility] Information     [facility] lets you send messages to your doctor, view your test results, renew your prescriptions, schedule appointments and more. To sign up, go to [email or web address] . Click on \"Log in\" on the left side of the screen, which will take you to the Welcome page. Then click on \"Sign " "up Now\" on the right side of the page.     You will be asked to enter the access code listed below, as well as some personal information. Please follow the directions to create your username and password.     Your access code is: [email or web address]  Expires: 2019  9:04 PM     Your access code will  in 90 days. If you need help or a new code, please call your [facility] clinic or [phone].        Care EveryWhere ID     This is your Care EveryWhere ID. This could be used by other organizations to access your [facility] medical records  [id number]        Equal Access to Services     [facility] : Hadjosefa Wade, fredi davenport, bebo zelaya, low valdez . So [facility] [phone].    ATENCIÓN: Si habla español, tiene a blank disposición servicios gratuitos de asistencia lingüística. Llame al [phone].    We comply with applicable federal civil rights laws and [place] laws. We do not discriminate on the basis of race, color, national origin, age, disability, sex, sexual orientation, or gender identity.            After Visit Summary       This is your record. Keep this with you and show to your community pharmacist(s) and doctor(s) at your next visit.                  "

## 2018-11-21 NOTE — ED AVS SNAPSHOT
[facility] Emergency Department    [address]    [facility] [address]    Phone:  [phone]    Fax:  [phone]                                       Tanvi Tinajero   MRN: [id number]    Department:  [facility] Emergency Department   Date of Visit:  11/21/2018           After Visit Summary Signature Page     I have received my discharge instructions, and my questions have been answered. I have discussed any challenges I see with this plan with the nurse or doctor.    ..........................................................................................................................................  Patient/Patient Representative Signature      ..........................................................................................................................................  Patient Representative Print Name and Relationship to Patient    ..................................................               ................................................  Date                                   Time    ..........................................................................................................................................  Reviewed by Signature/Title    ...................................................              ..............................................  Date                                               Time          22EPIC Rev 08/18

## 2018-11-22 NOTE — ED PROVIDER NOTES
"  History     Chief Complaint:  Mouth Lesions    HPI   Tanvi Tinajero is a 38 year old male who presents for evaluation of mouth lesions. He reports having \"cold sores\" on the inside of his lower lip for the past two days. Denies any sores on other places on his body. Denies any contact with anyone else with sores. Denies sore throat.     Allergies:  No Known Drug Allergies      Medications:    The patient is not currently taking any prescribed medications.     Past Medical History:    The patient denies any significant past medical history.     Past Surgical History:    The patient does not have any pertinent past surgical history.     Family History:    No past pertinent family history.     Social History:  Relationship status: Single  The patient presents on his own.    Marital Status:  Single [1]     Review of Systems   HENT: Positive for mouth sores. Negative for sore throat.    All other systems reviewed and are negative.    Physical Exam     Patient Vitals for the past 24 hrs:   BP Temp Temp src Pulse Resp SpO2   11/21/18 2019 146/88 97.9  F (36.6  C) Temporal 82 16 97 %        Physical Exam     General: Patient is alert and interactive when I enter the room  Head:  The scalp, face, and head appear normal  Eyes:  Conjunctivae are normal  ENT:    The nose is normal    Pinnae are normal    External acoustic canals are normal    There is a single apthous ulcer to the right lower inner lip. No swelling. No facial cellulitis.   Neck:  Trachea midline  CV:  Pulses are normal.   Resp:  No respiratory distress   Musc:  Normal muscular tone    No major joint effusions    No asymmetric leg swelling    Good capillary refill noted  Skin:  No rash or lesions noted  Neuro: Speech is normal and fluent. Face is symmetric.     Moving all extremities well.   Psych:  Awake. Alert.  Normal affect.  Appropriate interactions.    Emergency Department Course     Emergency Department Course:  Nursing notes and vitals " reviewed.     I performed an exam of the patient as documented above.     Findings and plan explained to the patient. Patient discharged home with instructions regarding supportive care, medications, and reasons to return. The importance of close follow-up was reviewed.      I personally answered all related questions prior to discharge.    Impression & Plan      Medical Decision Makin yo male w/ mouth lesion.  DDx includes canker sore/apthous ulcer vs viral.   Doubt coxsackie or enterovirus at this point.  Suportive cares    Diagnosis:    ICD-10-CM    1. Aphthous ulcer K12.0         Disposition:   Discharged to home      Discharge Medications:  Discharge Medication List as of 2018  9:04 PM      START taking these medications    Details   Sodium Fluoride 0.243 % PSTE Take 1 Application by mouth 4 times daily as needed, Disp-1 Tube, R-0, Local PrintMay substitute with another canker sore medication if needed. .             Scribe Disclosure:  I, Mariana Timmons, am serving as a scribe at 8:28 PM on 2018 to document services personally performed by Chung Wharton MD, based on my observations and the provider's statements to me.     Mariana Timmons  2018   [facility] EMERGENCY DEPARTMENT       Chung Wharton MD  [phone]

## 2018-11-22 NOTE — DISCHARGE INSTRUCTIONS
Canker Sore    A canker sore (also called an aphthous ulcer) is a painful sore on the lining of the mouth. It is most painful during the first few days, and it lasts about 7 to 14 days before going away.  Causes  Canker sores are not cold sores or fever blisters. They are not contagious, so they are not spread by contact. The exact cause of canker sores is not clear, but there are a number of things that can trigger them in different people.    Mild injury, such as biting the inside of the mouth, lip, or cheek, or dental procedures    Stress    Poor diet, or lack of certain nutrients, including B vitamins and iron    Foods that can irritate the mouth, including tomatoes, citrus fruits, and some nuts (foods that are acidic or contain bitter substances called tannins)    Irritating chemicals, such as those in some toothpastes and mouthwashes    Certain chronic illnesses  Symptoms  Canker sores are found on the lining of the mouth. They can be inside the cheeks or lips, on the roof of the mouth, at the base of the gums, on the tongue, or in the back of the throat. Canker sores typically have these characteristics:    Small, flat (not raised) sores    Can be white or yellowish bumps that are red around the edges or have a red halo    Usually small in size, roundish, and in groups    Accompanied by pain or burning  Canker sores don't leave a scar. But they usually come back.  Home care  The goals of canker sore treatment are to decrease the pain, speed healing, and prevent sores from coming back. No single treatment works for everyone. Try a number of methods to see what works best.  General care    You may find that soft, easy-to-chew foods cause less pain. Use a straw to direct liquids away from the sore.    Use a soft-bristle toothbrush, and brush your teeth gently.    Don t eat acidic, salty, or spicy foods.    Don t eat crusty or crunchy foods such as [place] bread or potato chips. These kinds of foods can hurt the  inside of your mouth, or scrape your existing canker sores.  Medicines  You can try over-the-counter medicines that cover the sores and numb them. This protects the sores while they heal and helps reduce pain.  Homemade rinses and solutions  You can use these solutions as mouth rinses. Spit them out after using them. You can also dab them on the sores. You can repeat these treatments as often as needed.    Rinse your mouth with saltwater.    Mix equal amounts of hydrogen peroxide and water. You can use this as a mouthwash or dab it on spots with a cotton swab. You can also add sodium bicarbonate to this to make a paste, and then dab it on spots.  Follow-up care  Follow up with your healthcare provider, or as advised.    If a culture was done, you will be notified if the treatment needs to be changed. You can call as directed for the results.  Call 911  Call 911 if any of these occur:    Trouble breathing    Inability to swallow    Extreme drowsiness or trouble waking up    Fainting or loss of consciousness    Rapid heart rate    Seizure    Stiff neck  When to seek medical advice  Call your healthcare provider right away if any of these occur:    You have a fever of 100.4 F (38 C) or higher, or as directed by your provider    You are pregnant    You just had surgery or another medical procedure, or you were just discharged from the hospital    It's too painful to eat or swallow  Date Last Reviewed: 10/1/2017    8344-5602 [employer]. [address] [place] [address], [place] [address]. All rights reserved. This information is not intended as a substitute for professional medical care. Always follow your healthcare professional's instructions.

## 2018-12-13 ENCOUNTER — TELEPHONE (OUTPATIENT)
Dept: FAMILY MEDICINE | Facility: CLINIC | Age: 38
End: 2018-12-13

## 2018-12-13 NOTE — TELEPHONE ENCOUNTER
Attempted to reach patient to schedule laten TB F/U with PharmD. LVM with call back to schedule.    Tia Gagnon, Patient Representative

## 2018-12-26 NOTE — PROGRESS NOTES
Pt participated in Health Literacy Class.         Pre- Evaluation     1. Pt has completed 12yrs of school  2. Pt feels her health is excellent  3. Pt is somewhat confident in managing her chronic conditions      Verónica Rooney

## 2019-03-15 ENCOUNTER — TELEPHONE (OUTPATIENT)
Dept: FAMILY MEDICINE | Facility: CLINIC | Age: 39
End: 2019-03-15

## 2019-03-15 NOTE — TELEPHONE ENCOUNTER
PHARMACY TELEPHONE ENCOUNTER:    Reason: LTBI FU    Called to discuss missed visit and his medication experience.      Has been taking INH consistently   No ELIZABETH to report    Pharmacy refills at [facility].  11/15/18  12/11/18  1/15/19  2/19/19    PLAN:  Will Follow-up in 2 months as a pharmacy visit.      Mirza Medina  was the authorizing prescriber for this visit through the pharmacist collaborative practice agreement.    Heron Kan Pharm.D.

## 2019-05-21 ENCOUNTER — OFFICE VISIT (OUTPATIENT)
Dept: FAMILY MEDICINE | Facility: CLINIC | Age: 39
End: 2019-05-21
Payer: COMMERCIAL

## 2019-05-21 VITALS
RESPIRATION RATE: 16 BRPM | DIASTOLIC BLOOD PRESSURE: 66 MMHG | TEMPERATURE: 97.5 F | HEIGHT: 68 IN | OXYGEN SATURATION: 94 % | BODY MASS INDEX: 21.4 KG/M2 | SYSTOLIC BLOOD PRESSURE: 92 MMHG | HEART RATE: 70 BPM | WEIGHT: 141.2 LBS

## 2019-05-21 DIAGNOSIS — H61.22 IMPACTED CERUMEN OF LEFT EAR: Primary | ICD-10-CM

## 2019-05-21 DIAGNOSIS — M79.10 MYALGIA: ICD-10-CM

## 2019-05-21 DIAGNOSIS — Z13.9 SCREENING FOR CONDITION: ICD-10-CM

## 2019-05-21 DIAGNOSIS — H90.72 MIXED CONDUCTIVE AND SENSORINEURAL HEARING LOSS OF LEFT EAR WITH UNRESTRICTED HEARING OF RIGHT EAR: ICD-10-CM

## 2019-05-21 ASSESSMENT — MIFFLIN-ST. JEOR: SCORE: 1539.98

## 2019-05-21 NOTE — PROGRESS NOTES
Preceptor Attestation:   Patient seen, evaluated and discussed with the resident. I have verified the content of the note, which accurately reflects my assessment of the patient and the plan of care.   Supervising Physician:  Whitney Francois MD

## 2019-05-21 NOTE — PROGRESS NOTES
"       HPI       Toribio Rooney is a 39 year old  who presents for   Chief Complaint   Patient presents with     Referral     to physical therapy      Ear Problem     left ear is block, since yesterday        1. Acute Illness   Concerns: cant hear out of left ear  When did it start? Yesterday  Patient reports he woke up yesterday and his left ear was completely blocked. He has since not been able to hear anything out of that ear. Right ear unaffected. No other symptoms, no recent URI symptoms, no trauma to the ear, has not inserted anything in ear. Never had anything similar in the past. No recent drainage from ear.     Therapies Tried and outcome: Nothing    2. Neck and back pain  Has long standing neck and back pain, has been going to PT with good results, referral has run out, requesting renewal.     3. STI screening  Is getting  soon and would like to be screened for STIs. No symptoms. Would like to have done after Ramadan.       +++++++      Problem, Medication and Allergy Lists were reviewed and updated if needed..    Patient is an established patient of this clinic..         Review of Systems:   Review of Systems 8 system ROS negative other than as noted in HPI         Physical Exam:     Vitals:    05/21/19 1617   BP: 92/66   Pulse: 70   Resp: 16   Temp: 97.5  F (36.4  C)   TempSrc: Oral   SpO2: 94%   Weight: 64 kg (141 lb 3.2 oz)   Height: 1.727 m (5' 8\")     Body mass index is 21.47 kg/m .  Vitals were reviewed and were normal     Physical Exam   Constitutional: He is oriented to person, place, and time. He appears well-developed and well-nourished. No distress.   HENT:   Head: Normocephalic and atraumatic.   Right Ear: Hearing, tympanic membrane and ear canal normal. No mastoid tenderness.   Left Ear: No drainage. No foreign bodies. No mastoid tenderness. Decreased hearing is noted.   Nose: Nose normal.   Mouth/Throat: Uvula is midline.   Significant cerumen bilaterally. Difficult to visualize " left TM 2/2 cerumen, despite flushing, does appear TM may be perforated.    Eyes: Conjunctivae and EOM are normal. Right eye exhibits no discharge. Left eye exhibits no discharge.   Neck: Normal range of motion. Neck supple.   Cardiovascular: Normal rate and regular rhythm.   No murmur heard.  Pulmonary/Chest: Effort normal and breath sounds normal. No respiratory distress. He has no wheezes. He has no rales.   Musculoskeletal: Normal range of motion.   Neurological: He is alert and oriented to person, place, and time.   Skin: Skin is warm and dry. Capillary refill takes less than 2 seconds.   Psychiatric: He has a normal mood and affect.   Nursing note and vitals reviewed.        Results:   Labs ordered as future, patient to return to have drawn post Ramadan     Assessment and Plan        1. Impacted cerumen of left ear, with left ear hearing loss  Otherwise well 40 yo presenting with 2 days of left ear decreased hearing. On exam canal is completely obstructed by cerumen, after flushing and scrapping out of cerumen, still unable to clear canal completely, able to partially visualize TM and concern for perforation. Patient reports slightly better hearing post cleaning.   - will refer to ENT out of concern for permanent hearing damage   - OTOLARYNGOLOGY REFERRAL - INTERNAL    3. Myalgia  Has long standing neck and back pain, has been going to PT with good results, referral has run out, requesting renewal.   - PHYSICAL THERAPY REFERRAL - EXTERNAL; Future    4. Screening for condition  Would like to be screened for STIs. No symptoms. Would like to have done after Ramadan.   - Neisseria gonorrhoeae PCR; Future  - Chlamydia trachomatis PCR; Future  - Treponema Abs w Reflex to RPR and Titer; Future  - HIV Antigen Antibody Combo; Future       Medications Discontinued During This Encounter   Medication Reason     acetaminophen (TYLENOL) 325 MG tablet      ibuprofen (ADVIL,MOTRIN) 600 MG tablet        Options for treatment  and follow-up care were reviewed with the patient. Toribio Rooney  engaged in the decision making process and verbalized understanding of the options discussed and agreed with the final plan.    Steph Woods MD

## 2019-06-27 ENCOUNTER — OFFICE VISIT (OUTPATIENT)
Dept: FAMILY MEDICINE | Facility: CLINIC | Age: 39
End: 2019-06-27
Payer: COMMERCIAL

## 2019-06-27 VITALS
TEMPERATURE: 97.3 F | OXYGEN SATURATION: 97 % | RESPIRATION RATE: 16 BRPM | HEART RATE: 61 BPM | HEIGHT: 68 IN | SYSTOLIC BLOOD PRESSURE: 107 MMHG | WEIGHT: 156.8 LBS | BODY MASS INDEX: 23.76 KG/M2 | DIASTOLIC BLOOD PRESSURE: 74 MMHG

## 2019-06-27 DIAGNOSIS — K59.00 CONSTIPATION, UNSPECIFIED CONSTIPATION TYPE: ICD-10-CM

## 2019-06-27 DIAGNOSIS — Z00.00 ROUTINE GENERAL MEDICAL EXAMINATION AT A HEALTH CARE FACILITY: ICD-10-CM

## 2019-06-27 DIAGNOSIS — R33.9 URINARY RETENTION: ICD-10-CM

## 2019-06-27 DIAGNOSIS — K21.9 GASTROESOPHAGEAL REFLUX DISEASE WITHOUT ESOPHAGITIS: Primary | ICD-10-CM

## 2019-06-27 LAB
BILIRUBIN UR: NEGATIVE
BLOOD UR: NEGATIVE
BUN SERPL-MCNC: 6.9 MG/DL (ref 7–21)
CALCIUM SERPL-MCNC: 9.4 MG/DL (ref 8.5–10.1)
CHLORIDE SERPLBLD-SCNC: 102.9 MMOL/L (ref 98–110)
CHOLEST SERPL-MCNC: 194.3 MG/DL (ref 0–200)
CHOLEST/HDLC SERPL: 4.7 {RATIO} (ref 0–5)
CO2 SERPL-SCNC: 27 MMOL/L (ref 20–32)
CREAT SERPL-MCNC: 0.7 MG/DL (ref 0.7–1.3)
GFR SERPL CREATININE-BSD FRML MDRD: >90 ML/MIN/1.7 M2
GLUCOSE SERPL-MCNC: 103.3 MG'DL (ref 70–99)
GLUCOSE URINE: NEGATIVE
HDLC SERPL-MCNC: 41.7 MG/DL
KETONES UR QL: NEGATIVE
LDLC SERPL CALC-MCNC: 130 MG/DL (ref 0–129)
LEUKOCYTE ESTERASE UR: NEGATIVE
NITRITE UR QL STRIP: NEGATIVE
PH UR STRIP: 7 [PH] (ref 4.5–8)
POTASSIUM SERPL-SCNC: 4.3 MMOL/DL (ref 3.3–4.5)
PROTEIN UR: NEGATIVE
PSA SERPL-ACNC: 0.79 UG/L (ref 0–4)
SODIUM SERPL-SCNC: 140.2 MMOL/L (ref 132.6–141.4)
SP GR UR STRIP: 1.02 (ref 1–1.03)
TRIGL SERPL-MCNC: 114.5 MG/DL (ref 0–150)
UROBILINOGEN UR STRIP-ACNC: NORMAL
VLDL CHOLESTEROL: 22.9 MG/DL (ref 7–32)

## 2019-06-27 ASSESSMENT — MIFFLIN-ST. JEOR: SCORE: 1610.74

## 2019-06-27 NOTE — LETTER
June 27, 2019      Toribio Rooney  [address] [facility] [address]        Dear Toribio,    Thank you for getting your care at [facility]. Please see below for your test results.    Resulted Orders   Prostate spec antigen screen   Result Value Ref Range    PSA 0.79 0 - 4 ug/L      Comment:      Assay Method:  Chemiluminescence using Siemens Vista analyzer   Basic Metabolic Panel ([facility])   Result Value Ref Range    Urea Nitrogen 6.9 (L) 7.0 - 21.0 mg/dL    Calcium 9.4 8.5 - 10.1 mg/dL    Chloride 102.9 98.0 - 110.0 mmol/L    Carbon Dioxide 27.0 20.0 - 32.0 mmol/L    Creatinine 0.7 0.7 - 1.3 mg/dL    Glucose 103.3 (H) 70.0 - 99.0 mg'dL    Potassium 4.3 3.3 - 4.5 mmol/dL    Sodium 140.2 132.6 - 141.4 mmol/L    GFR Estimate >90 >60.0 mL/min/1.7 m2    GFR Estimate If Black >90 >60.0 mL/min/1.7 m2   Lipid Cascade ([facility])   Result Value Ref Range    Cholesterol 194.3 0.0 - 200.0 mg/dL    Cholesterol/HDL Ratio 4.7 0.0 - 5.0    HDL Cholesterol 41.7 >40.0 mg/dL    LDL Cholesterol Calculated 130 (H) 0 - 129 mg/dL    Triglycerides 114.5 0.0 - 150.0 mg/dL    VLDL Cholesterol 22.9 7.0 - 32.0 mg/dL   Urinalysis, Micro If (UA) ([facility])   Result Value Ref Range    Specific Gravity Urine 1.020 1.005 - 1.030    pH Urine 7.0 4.5 - 8.0    Leukocyte Esterase UR Negative NEGATIVE    Nitrite Urine Negative NEGATIVE    Protein UR Negative NEGATIVE    Glucose Urine Negative NEGATIVE    Ketones Urine Negative NEGATIVE    Urobilinogen mg/dL 0.2 E.U./dL 0.2 E.U./dL    Bilirubin UR Negative NEGATIVE    Blood UR Negative NEGATIVE           Sincerely,    Abram Martell MD

## 2019-06-27 NOTE — PATIENT INSTRUCTIONS
Here is the plan from today's visit    1. Gastroesophageal reflux disease without esophagitis  - ranitidine (ZANTAC) 75 MG tablet; Take 1 tablet (75 mg) by mouth 2 times daily  Dispense: 30 tablet; Refill: 3    2. Constipation, unspecified constipation type  - psyllium (METAMUCIL/KONSYL) 58.6 % powder; Take 18 g (1 Tablespoonful) by mouth daily  Dispense: 425 g; Refill: 0    3. Urinary retention  - Prostate spec antigen screen  - Urinalysis, Micro If (UA) ([facility])    4. Routine general medical examination at a health care facility  - Basic Metabolic Panel ([facility])  - Lipid Cascade ([facility])      Please call or return to clinic if your symptoms don't go away.    Follow up plan  Please make a clinic appointment for follow up with your primary physician Mirza Sosa MD in 3-5 days to review labs.     Thank you for coming to [facility] today.  Lab Testing:  **If you had lab testing today and your results are reassuring or normal they will be mailed to you or sent through [employer] within 7 days.   **If the lab tests need quick action we will call you with the results.  The phone number we will call with results is # [phone] (home) . If this is not the best number please call our clinic and change the number.  Medication Refills:  If you need any refills please call your pharmacy and they will contact us.   If you need to  your refill at a new pharmacy, please contact the new pharmacy directly. The new pharmacy will help you get your medications transferred faster.   Scheduling:  If you have any concerns about today's visit or wish to schedule another appointment please call our office during normal business hours [phone] (8-5:00 M-F)  If a referral was made to a [facility] and you don't get a call from central scheduling please call [phone].  If a Mammogram was ordered for you at [facility] call [phone] to schedule or change your appointment.  If you  had an XRay/CT/Ultrasound/MRI ordered the number is [phone] to schedule or change your radiology appointment.   Medical Concerns:  If you have urgent medical concerns please call [phone] at any time of the day.    Abram Martell MD

## 2019-06-27 NOTE — PROGRESS NOTES
HPI       Toribio Rooney is a 39 year old  who presents for   Chief Complaint   Patient presents with     Abdominal Pain     per pt he thinks it is gas pain the last 2 days     Headache     more frequently     Urinary retention  Patient is a 39-year-old gentleman with no significant past medical history who is presenting today for 2 concerns 1 of which is urinary retention that has been present for nearly 2 to 3 months.  Describes a urinary retention as no issues initiating urine or sustaining urinary stream however upon completion of session, he feels as if he has not completely emptied his bladder.  Shortly after that he has a few trickles.  Denies any burning or pain during urination and is never noticed any discoloration or obvious hematuria.      Epigastric pain     Onset: 2-3 days    Description:   Character: Dull ache and Burning  Location: epigastric region  Radiation: None    Intensity: mild    Progression of Symptoms:  constant    Accompanying Signs & Symptoms:  Fever/Chills?: No   Gas/Bloating:  YES  Dysuria:No   Hematuria: No   Nausea: No   Vomiting: No   Diarrhea:No   Constipation: YES, chronic    History:           Trauma: No   Previous similar pain: No    Previous tests done: none    Precipitating factors:   Does the pain change with:     Food?: YES- worst with pain    BM?: no     Urination:no     What makes it better?: no    Therapies Tried and outcome: Nothing    LMP:  not applicable     +++++++   Exercise:No exercise      Problem, Medication and Allergy Lists were      Patient Active Problem List    Diagnosis Date Noted     Latent tuberculosis by blood test 11/21/2018     Priority: Medium     Initiated treatment with 9 month course of isoniazid 11/15/2018       Positive QuantiFERON-TB Gold test 08/22/2018     Priority: Medium     Slow transit constipation 06/01/2017     Priority: Medium     Diet.        External hemorrhoids 01/07/2016     Priority: Medium     Lipoma of skin and  subcutaneous tissue 09/29/2014     Priority: Medium     Herpes simplex virus (HSV) infection 09/29/2014     Priority: Medium     Problem list name updated by automated process. Provider to review     ,     Current Outpatient Medications   Medication Sig Dispense Refill     psyllium (METAMUCIL/KONSYL) 58.6 % powder Take 18 g (1 Tablespoonful) by mouth daily 425 g 0     pyridoxine (VITAMIN B-6) 50 MG TABS Take 1 tablet (50 mg) by mouth daily 90 tablet 3     ranitidine (ZANTAC) 75 MG tablet Take 1 tablet (75 mg) by mouth 2 times daily 30 tablet 3     valACYclovir (VALTREX) 500 MG tablet Take 1 tablet (500 mg) by mouth daily 90 tablet 0     isoniazid (NYDRAZID) 300 MG tablet Take 1 tablet (300 mg) by mouth daily (Patient not taking: Reported on 5/21/2019) 90 tablet 2     multivitamin, therapeutic with minerals (MULTI-VITAMIN) TABS tablet Take 1 tablet by mouth daily (Patient not taking: Reported on 5/21/2019) 100 tablet 3     PARoxetine (PAXIL) 20 MG tablet Take 1 tablet (20 mg) by mouth every morning (Patient not taking: Reported on 5/21/2019) 60 tablet 1     valACYclovir (VALTREX) 1000 mg tablet Take 2 tablets (2,000 mg) by mouth 2 times daily for 1 day 4 tablet 0   ,   No Known Allergies.    Patient is   an established patient of this clinic.  No past medical history on file.  Family History   Family history unknown: Yes     Social History     Socioeconomic History     Marital status:      Spouse name: None     Number of children: None     Years of education: None     Highest education level: None   Occupational History     None   Social Needs     Financial resource strain: None     Food insecurity:     Worry: None     Inability: None     Transportation needs:     Medical: None     Non-medical: None   Tobacco Use     Smoking status: Never Smoker     Smokeless tobacco: Never Used   Substance and Sexual Activity     Alcohol use: No     Drug use: No     Sexual activity: Not Currently   Lifestyle     Physical  "activity:     Days per week: None     Minutes per session: None     Stress: None   Relationships     Social connections:     Talks on phone: None     Gets together: None     Attends [employer] service: None     Active member of club or organization: None     Attends meetings of clubs or organizations: None     Relationship status: None     Intimate partner violence:     Fear of current or ex partner: None     Emotionally abused: None     Physically abused: None     Forced sexual activity: None   Other Topics Concern     Parent/sibling w/ CABG, MI or angioplasty before 65F 55M? Not Asked   Social History Narrative     None   .         Review of Systems:   Review of Systems  Skin: negative except as above  Respiratory: negative except as above  Cardiovascular: negative except as above  Gastrointestinal: negative except as above  Genitourinary: negative except as above  Musculoskeletal: negative except as above  Neurologic: negative except as above  Psychiatric: negative except as above  Hematologic/Lymphatic/Immunologic: negative except as above  Endocrine: negative except as above         Physical Exam:     Vitals:    06/27/19 1133   BP: 107/74   BP Location: Left arm   Patient Position: Sitting   Cuff Size: Adult Regular   Pulse: 61   Resp: 16   Temp: 97.3  F (36.3  C)   TempSrc: Oral   SpO2: 97%   Weight: 71.1 kg (156 lb 12.8 oz)   Height: 1.727 m (5' 8\")     Body mass index is 23.84 kg/m .  Vitals were reviewed and were normal     Physical Exam  Constitutional: Oriented to person, place, and time. Appears well-developed and well-nourished.   Abdominal: Soft.  Mild tenderness to palpation at the epigastric region   Musculoskeletal: Normal range of motion.   Neurological: Alert and oriented to person, place, and time.   Skin: Skin is warm and dry.   Psychiatric: Has a normal mood and affect. Behavior is normal.       Results:      Results from this visit  Results for orders placed or performed in visit on 06/27/19 "   Prostate spec antigen screen   Result Value Ref Range    PSA 0.79 0 - 4 ug/L   Basic Metabolic Panel ([facility])   Result Value Ref Range    Urea Nitrogen 6.9 (L) 7.0 - 21.0 mg/dL    Calcium 9.4 8.5 - 10.1 mg/dL    Chloride 102.9 98.0 - 110.0 mmol/L    Carbon Dioxide 27.0 20.0 - 32.0 mmol/L    Creatinine 0.7 0.7 - 1.3 mg/dL    Glucose 103.3 (H) 70.0 - 99.0 mg'dL    Potassium 4.3 3.3 - 4.5 mmol/dL    Sodium 140.2 132.6 - 141.4 mmol/L    GFR Estimate >90 >60.0 mL/min/1.7 m2    GFR Estimate If Black >90 >60.0 mL/min/1.7 m2   Lipid Cascade ([facility])   Result Value Ref Range    Cholesterol 194.3 0.0 - 200.0 mg/dL    Cholesterol/HDL Ratio 4.7 0.0 - 5.0    HDL Cholesterol 41.7 >40.0 mg/dL    LDL Cholesterol Calculated 130 (H) 0 - 129 mg/dL    Triglycerides 114.5 0.0 - 150.0 mg/dL    VLDL Cholesterol 22.9 7.0 - 32.0 mg/dL   Urinalysis, Micro If (UA) ([facility])   Result Value Ref Range    Specific Gravity Urine 1.020 1.005 - 1.030    pH Urine 7.0 4.5 - 8.0    Leukocyte Esterase UR Negative NEGATIVE    Nitrite Urine Negative NEGATIVE    Protein UR Negative NEGATIVE    Glucose Urine Negative NEGATIVE    Ketones Urine Negative NEGATIVE    Urobilinogen mg/dL 0.2 E.U./dL 0.2 E.U./dL    Bilirubin UR Negative NEGATIVE    Blood UR Negative NEGATIVE       Assessment and Plan          1. Gastroesophageal reflux disease without esophagitis  At this time he is not a candidate for significant work-up given his acute onset and mild symptoms.  Will defer EGD and H. pylori test if symptoms do not improve with Zantac only.  - ranitidine (ZANTAC) 75 MG tablet; Take 1 tablet (75 mg) by mouth 2 times daily  Dispense: 30 tablet; Refill: 3    2. Constipation, unspecified constipation type  He was advised to increase p.o. hydration as he barely drinks any water throughout the day.  He was recommended to drink at least 3 to 4 glasses/bottles of water per day.  - psyllium (METAMUCIL/KONSYL) 58.6 % powder; Take 18 g (1 Tablespoonful) by mouth daily   Dispense: 425 g; Refill: 0    3. Urinary retention  Patient refused a prostate exam and therefore PSA is ordered.  Urinalysis was ordered to evaluate for hematuria or other findings.  His symptoms could also be related to constipation which ties into his urinary retention and epigastric discomfort.  - Prostate spec antigen screen  - Urinalysis, Micro If (UA) ([facility])    4. Routine general medical examination at a health care facility  - Basic Metabolic Panel ([facility])  - Lipid Cascade ([facility])      Please call or return to clinic if your symptoms don't go away.    Follow up plan  Please make a clinic appointment for follow up with your primary physician Mirza Sosa MD in 3-5 days to review labs.     Thank you for coming to [facility] today.     There are no discontinued medications.    Options for treatment and follow-up care were reviewed with the patient. Toribio Rooney  engaged in the decision making process and verbalized understanding of the options discussed and agreed with the final plan.    Abram Martell MD

## 2019-07-01 NOTE — PROGRESS NOTES
Preceptor Attestation:   Patient seen, evaluated and discussed with the resident. I have verified the content of the note, which accurately reflects my assessment of the patient and the plan of care.   Supervising Physician:  Randell Xie MD

## 2019-07-02 ENCOUNTER — OFFICE VISIT (OUTPATIENT)
Dept: FAMILY MEDICINE | Facility: CLINIC | Age: 39
End: 2019-07-02
Payer: COMMERCIAL

## 2019-07-02 VITALS
HEART RATE: 70 BPM | TEMPERATURE: 97.7 F | SYSTOLIC BLOOD PRESSURE: 106 MMHG | BODY MASS INDEX: 23.76 KG/M2 | HEIGHT: 68 IN | DIASTOLIC BLOOD PRESSURE: 73 MMHG | WEIGHT: 156.8 LBS

## 2019-07-02 DIAGNOSIS — K59.01 SLOW TRANSIT CONSTIPATION: ICD-10-CM

## 2019-07-02 DIAGNOSIS — K21.9 GASTROESOPHAGEAL REFLUX DISEASE WITHOUT ESOPHAGITIS: ICD-10-CM

## 2019-07-02 DIAGNOSIS — R33.9 URINARY RETENTION: Primary | ICD-10-CM

## 2019-07-02 ASSESSMENT — MIFFLIN-ST. JEOR: SCORE: 1615.61

## 2019-07-02 NOTE — PATIENT INSTRUCTIONS
Here is the plan from today's visit    1. Urinary retention  No further work-up is needed at this time for the urinary retention as this is likely related to either his constipation, decreased p.o. hydration, or potentially psychogenic.  He seems to have improved slightly from increasing his p.o. hydration and was recommended to drink at least 5 glasses of water per day.  Work-up so far with a UA was negative and therefore no further work-up is necessary at this time.    2. Slow transit constipation  Psyllium husk was prescribed however he has not been able to pick it up yet from the pharmacy.  Increase p.o. hydration and psyllium husk will improve his overall constipation.    3. Gastroesophageal reflux disease without esophagitis  Patient is currently taking Zantac as needed for gastroesophageal reflux disease and does not warrant any further work-up for this.  At this time stay the course on an as needed basis.      Please call or return to clinic if your symptoms don't go away.    Follow up plan  Please make a clinic appointment for follow up with your primary physician Mirza Sosa MD as needed.     Thank you for coming to [facility] today.  Lab Testing:  **If you had lab testing today and your results are reassuring or normal they will be mailed to you or sent through [employer] within 7 days.   **If the lab tests need quick action we will call you with the results.  The phone number we will call with results is # [phone] (home) . If this is not the best number please call our clinic and change the number.  Medication Refills:  If you need any refills please call your pharmacy and they will contact us.   If you need to  your refill at a new pharmacy, please contact the new pharmacy directly. The new pharmacy will help you get your medications transferred faster.   Scheduling:  If you have any concerns about today's visit or wish to schedule another appointment please call our office during  normal business hours [phone] (8-5:00 M-F)  If a referral was made to a [facility] and you don't get a call from central scheduling please call [phone].  If a Mammogram was ordered for you at [facility] call [phone] to schedule or change your appointment.  If you had an XRay/CT/Ultrasound/MRI ordered the number is [phone] to schedule or change your radiology appointment.   Medical Concerns:  If you have urgent medical concerns please call [phone] at any time of the day.    Abram Martell MD

## 2019-07-02 NOTE — PROGRESS NOTES
HPI       Toribio Rooney is a 39 year old  who presents for   Chief Complaint   Patient presents with     RECHECK     follow up on lab results     Patient is a 39-year-old gentleman who is presenting today for review of his recent work-up for the following and check and after initiating therapy:    1. GERD   No work-up was offered last week however clinically seems to improve after 1 dose of Zantac.  Patient states he has been taking Zantac only as needed and seems to have improved his symptoms.    2. Constipation   For his constipation he was advised to increase p.o. hydration and psyllium husk was ordered as well however has not been able to  the prescription.  Denies any bright red blood work discoloration of stool.  Denies any change in stool caliber recently.    3. Urinary retention  Patient's urinary retention is likely related to either constipation, decreased p.o. hydration leading to more concentrated urine which seems to be more irritative to the urethral mucosa, or psychogenic.  At this time a UA was negative and did elicit any particular etiology.      A UGAME  was used for  this visit.    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        External hemorrhoids 01/07/2016     Priority: Medium     Lipoma of skin and subcutaneous tissue 09/29/2014     Priority: Medium     Herpes simplex virus (HSV) infection 09/29/2014     Priority: Medium     Problem list name updated by automated process. Provider to review     ,     Current Outpatient Medications   Medication Sig Dispense Refill     psyllium (METAMUCIL/KONSYL) 58.6 % powder Take 18  g (1 Tablespoonful) by mouth daily 425 g 0     pyridoxine (VITAMIN B-6) 50 MG TABS Take 1 tablet (50 mg) by mouth daily 90 tablet 3     ranitidine (ZANTAC) 75 MG tablet Take 1 tablet (75 mg) by mouth 2 times daily 30 tablet 3     valACYclovir (VALTREX) 500 MG tablet Take 1 tablet (500 mg) by mouth daily 90 tablet 0     isoniazid (NYDRAZID) 300 MG tablet Take 1 tablet (300 mg) by mouth daily (Patient not taking: Reported on 5/21/2019) 90 tablet 2     multivitamin, therapeutic with minerals (MULTI-VITAMIN) TABS tablet Take 1 tablet by mouth daily (Patient not taking: Reported on 5/21/2019) 100 tablet 3     PARoxetine (PAXIL) 20 MG tablet Take 1 tablet (20 mg) by mouth every morning (Patient not taking: Reported on 5/21/2019) 60 tablet 1     valACYclovir (VALTREX) 1000 mg tablet Take 2 tablets (2,000 mg) by mouth 2 times daily for 1 day 4 tablet 0   ,   No Known Allergies.    Patient is   an established patient of this clinic.  History reviewed. No pertinent past medical history.  Family History   Family history unknown: Yes     Social History     Socioeconomic History     Marital status:      Spouse name: None     Number of children: None     Years of education: None     Highest education level: None   Occupational History     None   Social Needs     Financial resource strain: None     Food insecurity:     Worry: None     Inability: None     Transportation needs:     Medical: None     Non-medical: None   Tobacco Use     Smoking status: Never Smoker     Smokeless tobacco: Never Used   Substance and Sexual Activity     Alcohol use: No     Drug use: No     Sexual activity: Not Currently   Lifestyle     Physical activity:     Days per week: None     Minutes per session: None     Stress: None   Relationships     Social connections:     Talks on phone: None     Gets together: None     Attends [employer] service: None     Active member of club or organization: None     Attends meetings of clubs or organizations: None  "    Relationship status: None     Intimate partner violence:     Fear of current or ex partner: None     Emotionally abused: None     Physically abused: None     Forced sexual activity: None   Other Topics Concern     Parent/sibling w/ CABG, MI or angioplasty before 65F 55M? Not Asked   Social History Narrative     None   .         Review of Systems:   Review of Systems  Skin: negative except as above  Respiratory: negative except as above  Cardiovascular: negative except as above  Gastrointestinal: negative except as above  Genitourinary: negative except as above  Musculoskeletal: negative except as above  Neurologic: negative except as above  Psychiatric: negative except as above  Hematologic/Lymphatic/Immunologic: negative except as above  Endocrine: negative except as above         Physical Exam:     Vitals:    07/02/19 0925   BP: 106/73   Pulse: 70   Temp: 97.7  F (36.5  C)   TempSrc: Oral   Weight: 71.1 kg (156 lb 12.8 oz)   Height: 1.735 m (5' 8.31\")     Body mass index is 23.63 kg/m .  Vitals were reviewed and were normal     Physical Exam  Constitutional: Oriented to person, place, and time. Appears well-developed and well-nourished.   HENT:   Head: Normocephalic and atraumatic.   Eyes: Conjunctivae are normal.   Neck: Normal range of motion.   Cardiovascular: Normal rate, regular rhythm, normal heart sounds and intact distal pulses.    Pulmonary/Chest: Effort normal and breath sounds normal. No respiratory distress. No wheezes.   Musculoskeletal: Normal range of motion.   Neurological: Alert and oriented to person, place, and time.   Skin: Skin is warm and dry.   Psychiatric: Has a normal mood and affect. Behavior is normal.       Results:   Results from last visit:  Office Visit on 06/27/2019   Component Date Value Ref Range Status     PSA 06/27/2019 0.79  0 - 4 ug/L Final    Assay Method:  Chemiluminescence using Siemens Vista analyzer     Urea Nitrogen 06/27/2019 6.9* 7.0 - 21.0 mg/dL Final     Calcium " 06/27/2019 9.4  8.5 - 10.1 mg/dL Final     Chloride 06/27/2019 102.9  98.0 - 110.0 mmol/L Final     Carbon Dioxide 06/27/2019 27.0  20.0 - 32.0 mmol/L Final     Creatinine 06/27/2019 0.7  0.7 - 1.3 mg/dL Final     Glucose 06/27/2019 103.3* 70.0 - 99.0 mg'dL Final     Potassium 06/27/2019 4.3  3.3 - 4.5 mmol/dL Final     Sodium 06/27/2019 140.2  132.6 - 141.4 mmol/L Final     GFR Estimate 06/27/2019 >90  >60.0 mL/min/1.7 m2 Final     GFR Estimate If Black 06/27/2019 >90  >60.0 mL/min/1.7 m2 Final     Cholesterol 06/27/2019 194.3  0.0 - 200.0 mg/dL Final     Cholesterol/HDL Ratio 06/27/2019 4.7  0.0 - 5.0 Final     HDL Cholesterol 06/27/2019 41.7  >40.0 mg/dL Final     LDL Cholesterol Calculated 06/27/2019 130* 0 - 129 mg/dL Final     Triglycerides 06/27/2019 114.5  0.0 - 150.0 mg/dL Final     VLDL Cholesterol 06/27/2019 22.9  7.0 - 32.0 mg/dL Final     Specific Gravity Urine 06/27/2019 1.020  1.005 - 1.030 Final     pH Urine 06/27/2019 7.0  4.5 - 8.0 Final     Leukocyte Esterase UR 06/27/2019 Negative  NEGATIVE Final     Nitrite Urine 06/27/2019 Negative  NEGATIVE Final     Protein UR 06/27/2019 Negative  NEGATIVE Final     Glucose Urine 06/27/2019 Negative  NEGATIVE Final     Ketones Urine 06/27/2019 Negative  NEGATIVE Final     Urobilinogen mg/dL 06/27/2019 0.2 E.U./dL  0.2 E.U./dL Final     Bilirubin UR 06/27/2019 Negative  NEGATIVE Final     Blood UR 06/27/2019 Negative  NEGATIVE Final       Assessment and Plan        1. Urinary retention  No further work-up is needed at this time for the urinary retention as this is likely related to either his constipation, decreased p.o. hydration, or potentially psychogenic.  He seems to have improved slightly from increasing his p.o. hydration and was recommended to drink at least 5 glasses of water per day.  Work-up so far with a UA was negative and therefore no further work-up is necessary at this time.    2. Slow transit constipation  Psyllium husk was prescribed however  he has not been able to pick it up yet from the pharmacy.  Increase p.o. hydration and psyllium husk will improve his overall constipation.    3. Gastroesophageal reflux disease without esophagitis  Patient is currently taking Zantac as needed for gastroesophageal reflux disease and does not warrant any further work-up for this.  At this time stay the course on an as needed basis.      Please call or return to clinic if your symptoms don't go away.    Follow up plan  Please make a clinic appointment for follow up with your primary physician Mirza Sosa MD as needed.     Thank you for coming to [facility] today.     There are no discontinued medications.    Options for treatment and follow-up care were reviewed with the patient. Toribio Rooney  engaged in the decision making process and verbalized understanding of the options discussed and agreed with the final plan.    Abram Martell MD

## 2019-07-02 NOTE — NURSING NOTE
Due to patient being non-English speaking/uses sign language, an  was used for this visit. Only for face-to-face interpretation by an external agency, date and length of interpretation can be found on the scanned worksheet.     name: Danae Herring   Agency: [employer]  Language: Namibian   Telephone number: [phone]  Type of interpretation: Face-to-face, spoken

## 2019-07-10 ENCOUNTER — TRANSFERRED RECORDS (OUTPATIENT)
Dept: HEALTH INFORMATION MANAGEMENT | Facility: CLINIC | Age: 39
End: 2019-07-10

## 2019-07-11 ENCOUNTER — OFFICE VISIT (OUTPATIENT)
Dept: FAMILY MEDICINE | Facility: CLINIC | Age: 39
End: 2019-07-11
Payer: COMMERCIAL

## 2019-07-11 VITALS
OXYGEN SATURATION: 98 % | SYSTOLIC BLOOD PRESSURE: 97 MMHG | TEMPERATURE: 97.9 F | HEIGHT: 68 IN | WEIGHT: 157.6 LBS | RESPIRATION RATE: 16 BRPM | HEART RATE: 84 BPM | BODY MASS INDEX: 23.89 KG/M2 | DIASTOLIC BLOOD PRESSURE: 65 MMHG

## 2019-07-11 DIAGNOSIS — S16.1XXA CERVICAL STRAIN, INITIAL ENCOUNTER: Primary | ICD-10-CM

## 2019-07-11 DIAGNOSIS — R33.9 URINARY RETENTION: ICD-10-CM

## 2019-07-11 DIAGNOSIS — K59.01 SLOW TRANSIT CONSTIPATION: ICD-10-CM

## 2019-07-11 DIAGNOSIS — K21.9 GASTROESOPHAGEAL REFLUX DISEASE WITHOUT ESOPHAGITIS: ICD-10-CM

## 2019-07-11 ASSESSMENT — MIFFLIN-ST. JEOR: SCORE: 1614.37

## 2019-07-11 NOTE — PROGRESS NOTES
Preceptor Attestation:   Patient seen, evaluated and discussed with the resident. I have verified the content of the note, which accurately reflects my assessment of the patient and the plan of care.   Supervising Physician:  Murray Medeiros MD MD

## 2019-07-11 NOTE — NURSING NOTE
Due to patient being non-English speaking/uses sign language, an  was used for this visit. Only for face-to-face interpretation by an external agency, date and length of interpretation can be found on the scanned worksheet.     name: Danae Gavin  Agency: [employer]  Language: [place]   Telephone number: [phone]  Type of interpretation: Face-to-face, spoken

## 2019-07-11 NOTE — PATIENT INSTRUCTIONS
Here is the plan from today's visit    1. Cervical strain, initial encounter  PHYSICAL THERAPY REFERRAL - EXTERNAL; Future  Magnesium supplementation -600 mg/day over-the-counter    2. Slow transit constipation  Continue psyllium husk regularly and take it daily  Drink 3 to 5 glasses of water every day  Increased activity    3. Urinary retention  This is likely related to your constipation and will monitor until constipation is resolved  Normal urinalysis and at this time would not require any further investigation    4. Gastroesophageal reflux disease without esophagitis  Zantac as needed  Avoid caffeinated beverages  Avoid reflux and gastritis causing agents    Please call or return to clinic if your symptoms don't go away.    Follow up plan  Please make a clinic appointment for follow up with your primary physician Mirza Sosa MD as needed.     Thank you for coming to [facility] today.  Lab Testing:  **If you had lab testing today and your results are reassuring or normal they will be mailed to you or sent through [employer] within 7 days.   **If the lab tests need quick action we will call you with the results.  The phone number we will call with results is # [phone] (home) . If this is not the best number please call our clinic and change the number.  Medication Refills:  If you need any refills please call your pharmacy and they will contact us.   If you need to  your refill at a new pharmacy, please contact the new pharmacy directly. The new pharmacy will help you get your medications transferred faster.   Scheduling:  If you have any concerns about today's visit or wish to schedule another appointment please call our office during normal business hours [phone] (8-5:00 M-F)  If a referral was made to a [facility] and you don't get a call from central scheduling please call [phone].  If a Mammogram was ordered for you at [facility] call [phone]  to schedule or change your appointment.  If you had an XRay/CT/Ultrasound/MRI ordered the number is [phone] to schedule or change your radiology appointment.   Medical Concerns:  If you have urgent medical concerns please call [phone] at any time of the day.    Abram Martell MD

## 2019-07-11 NOTE — PROGRESS NOTES
DAVID Rooney MORENITA Rooney is a 39 year old  who presents for   Chief Complaint   Patient presents with     RECHECK     Follow-up:  Patient is a 39-year-old gentleman who was recently seen in clinic and now presenting for follow-up for the reasons listed below:    GERD   -Takes Zantac as needed very infrequently to control his symptoms when they occur seems to be pretty well controlled.  At this time there is no further work-up that has been indicated the patient is avoiding excessive caffeinated beverages.    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  He was advised to take psyllium husk on a regular basis with increasing p.o. hydration which she is planning to do now.  Overall his constipation is slightly improved.    Urinary retention   After the basic work-up during his last visit, it was presumed his vague urinary symptoms were related to constipation versus psychogenic and he was advised to cure his constipation and monitor for relief of urinary symptoms.  At this time there is no further work-up indicated.  Of note his symptoms do not seem to worsen and are pretty unchanged from the last visit.      New complaint:  Cervical strain  Patient is complaining of bilateral trapezius achiness along with the base of the scalp at the trapezius insertion site.  This is been going on for as long as he can remember and he states is likely related to posture.  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  He does not exercise or have any significant activities and previously was not hydrating himself well.  Denies any midline tenderness or discomfort.  Denies any neuropathic  symptoms including numbness or tingling or muscle weakness.      A [place]  was used for  this visit.    +++++++    Problem, Medication and Allergy Lists were      Patient Active Problem List    Diagnosis Date Noted     Latent tuberculosis by blood test 11/21/2018     Priority: Medium     Initiated treatment with 9 month course of isoniazid 11/15/2018       Positive QuantiFERON-TB Gold test 08/22/2018     Priority: Medium     Slow transit constipation 06/01/2017     Priority: Medium     Diet.        External hemorrhoids 01/07/2016     Priority: Medium     Lipoma of skin and subcutaneous tissue 09/29/2014     Priority: Medium     Herpes simplex virus (HSV) infection 09/29/2014     Priority: Medium     Problem list name updated by automated process. Provider to review     ,     Current Outpatient Medications   Medication Sig Dispense Refill     psyllium (METAMUCIL/KONSYL) 58.6 % powder Take 18 g (1 Tablespoonful) by mouth daily 425 g 0     pyridoxine (VITAMIN B-6) 50 MG TABS Take 1 tablet (50 mg) by mouth daily 90 tablet 3     ranitidine (ZANTAC) 75 MG tablet Take 1 tablet (75 mg) by mouth 2 times daily 30 tablet 3     isoniazid (NYDRAZID) 300 MG tablet Take 1 tablet (300 mg) by mouth daily (Patient not taking: Reported on 5/21/2019) 90 tablet 2     multivitamin, therapeutic with minerals (MULTI-VITAMIN) TABS tablet Take 1 tablet by mouth daily (Patient not taking: Reported on 5/21/2019) 100 tablet 3     PARoxetine (PAXIL) 20 MG tablet Take 1 tablet (20 mg) by mouth every morning (Patient not taking: Reported on 5/21/2019) 60 tablet 1     valACYclovir (VALTREX) 1000 mg tablet Take 2 tablets (2,000 mg) by mouth 2 times daily for 1 day 4 tablet 0     valACYclovir (VALTREX) 500 MG tablet Take 1 tablet (500 mg) by mouth daily 90 tablet 0   ,   No Known Allergies.    Patient is   an established patient of this clinic.  History reviewed. No pertinent past medical history.  Family History   Family history unknown:  Yes     Social History     Socioeconomic History     Marital status:      Spouse name: None     Number of children: None     Years of education: None     Highest education level: None   Occupational History     None   Social Needs     Financial resource strain: None     Food insecurity:     Worry: None     Inability: None     Transportation needs:     Medical: None     Non-medical: None   Tobacco Use     Smoking status: Never Smoker     Smokeless tobacco: Never Used   Substance and Sexual Activity     Alcohol use: No     Drug use: No     Sexual activity: Not Currently   Lifestyle     Physical activity:     Days per week: None     Minutes per session: None     Stress: None   Relationships     Social connections:     Talks on phone: None     Gets together: None     Attends [employer] service: None     Active member of club or organization: None     Attends meetings of clubs or organizations: None     Relationship status: None     Intimate partner violence:     Fear of current or ex partner: None     Emotionally abused: None     Physically abused: None     Forced sexual activity: None   Other Topics Concern     Parent/sibling w/ CABG, MI or angioplasty before 65F 55M? Not Asked   Social History Narrative     None   .         Review of Systems:   Review of Systems  Skin: negative except as above  Respiratory: negative except as above  Cardiovascular: negative except as above  Gastrointestinal: negative except as above  Genitourinary: negative except as above  Musculoskeletal: negative except as above  Neurologic: negative except as above  Psychiatric: negative except as above  Hematologic/Lymphatic/Immunologic: negative except as above  Endocrine: negative except as above         Physical Exam:     Vitals:    07/11/19 0759   BP: 97/65   BP Location: Left arm   Patient Position: Sitting   Cuff Size: Adult Regular   Pulse: 84   Resp: 16   Temp: 97.9  F (36.6  C)   TempSrc: Oral   SpO2: 98%   Weight: 71.5 kg (157 lb  "9.6 oz)   Height: 1.727 m (5' 8\")     Body mass index is 23.96 kg/m .  Vitals were reviewed and were normal     Physical Exam  Constitutional: Oriented to person, place, and time. Appears well-developed and well-nourished.   HENT:   Head: Normocephalic and atraumatic.   Eyes: Conjunctivae are normal.   Neck: Normal range of motion. Tenderness to palpation the paraspinal cervical areas, point tenderness at the base of the scalp the trapezius insertion site.  Nontender midline.  Musculoskeletal: Normal range of motion. Point tenderness over the rhomboids, paraspinal cervical and thoracic spine bilaterally.  No midline tenderness to palpation.  Neurological: Alert and oriented to person, place, and time.   Skin: Skin is warm and dry.   Psychiatric: Has a normal mood and affect. Behavior is normal.       Results:   No testing ordered today    Assessment and Plan      1. Cervical strain, initial encounter  PHYSICAL THERAPY REFERRAL - EXTERNAL; Future  Magnesium supplementation -600 mg/day over-the-counter    2. Slow transit constipation  Continue psyllium husk regularly and take it daily  Drink 3 to 5 glasses of water every day  Increased activity    3. Urinary retention  This is likely related to your constipation and will monitor until constipation is resolved  Normal urinalysis and at this time would not require any further investigation    4. Gastroesophageal reflux disease without esophagitis  Zantac as needed  Avoid caffeinated beverages  Avoid reflux and gastritis causing agents    Please call or return to clinic if your symptoms don't go away.    Follow up plan  Please make a clinic appointment for follow up with your primary physician Mirza Sosa MD as needed.     Thank you for coming to [facility] today.         There are no discontinued medications.    Options for treatment and follow-up care were reviewed with the patient. Toribio Rooney  engaged in the decision making process and verbalized " understanding of the options discussed and agreed with the final plan.    Abram Martell MD

## 2019-07-12 ENCOUNTER — TELEPHONE (OUTPATIENT)
Dept: PHARMACY | Facility: CLINIC | Age: 39
End: 2019-07-12

## 2019-07-12 DIAGNOSIS — Z22.7 LATENT TUBERCULOSIS DIAGNOSED BY BLOOD TEST: ICD-10-CM

## 2019-07-12 RX ORDER — ISONIAZID 300 MG/1
300 TABLET ORAL DAILY
Qty: 90 TABLET | Refills: 0 | Status: SHIPPED | OUTPATIENT
Start: 2019-07-12 | End: 2020-05-12

## 2019-07-12 NOTE — TELEPHONE ENCOUNTER
[facility] Reminder     I called the patient to remind them about their isoniazid therapy.  Patient did not  the prescription in June.  Please see pharmacy pickup dates below.  Patient understands that they have missed 2 months of therapy.  He will return to the pharmacy on Monday to  his new medication.  I sent a prescription for a 3-month supply.  He reports no new problems.    Refill dates.    11/15/18  12/11/18  1/16/19  2/22/19  4/9/19  5/29/19    Completed 6/9 months of therapy    Heron Kan, Pharm.D.

## 2019-09-17 DIAGNOSIS — B00.9 HERPES SIMPLEX VIRUS (HSV) INFECTION: ICD-10-CM

## 2019-09-17 RX ORDER — VALACYCLOVIR HYDROCHLORIDE 500 MG/1
500 TABLET, FILM COATED ORAL DAILY
Qty: 90 TABLET | Refills: 3 | Status: SHIPPED | OUTPATIENT
Start: 2019-09-17 | End: 2020-05-12

## 2019-12-07 NOTE — PROGRESS NOTES
HPI:       Toribio Rooney is a 37 year old who presents for the following    Patient complains of sores in mouth and tongue.  States he gets every couple of months.  This most recent episode started 4 days ago.  Pain is present but worse with eating.  Describes pain as a burning.  No drainage from lesions.  When they recur they can be in different locations.    A [place]  was used for  this visit.        Problem, Medication and Allergy Lists were reviewed and are current..    Patient is an established patient of this clinic.         Physical Exam:     Vitals:    07/07/17 0850   BP: 108/76   Pulse: 80   Resp: 16   Temp: 97.9  F (36.6  C)   TempSrc: Oral   SpO2: 97%   Weight: 158 lb 3.2 oz (71.8 kg)     Body mass index is 24.05 kg/(m^2).  Vitals were reviewed and were normal  GENERAL: healthy, alert and no distress  HENT: ear canals and TM's normal, oral mucous membranes moist and ulcerative lesions on left check and left tip of tongue  NECK: no tenderness, no adenopathy, no asymmetry, no masses, no stiffness; thyroid- normal to palpation  PSYCH: Alert and oriented times 3; coherent speech, normal rate and volume, able to articulate logical thoughts, able to abstract reason, no tangential thoughts, no hallucinations or delusions. Affect is normal.      Assessment and Plan     1. Herpes simplex virus (HSV) infection  - treat current outbreak with the following:  valACYclovir (VALTREX) 1000 mg tablet; Take 2 tablets (2,000 mg) by mouth 2 times daily for 1 day  Dispense: 4 tablet; Refill: 0  - start suppressive treatment with:  valACYclovir (VALTREX) 500 MG tablet; Take 1 tablet (500 mg) by mouth daily  Dispense: 90 tablet; Refill: 3    25 min spent in direct face to face time with this patient, greater than 50% in counseling regarding above.    Options for treatment and follow-up care were reviewed with the patient. Toribio Rooney  engaged in the decision making process and verbalized understanding  of the options discussed and agreed with the final plan.    Mirza Sosa MD     [facility]ors/FEVER

## 2020-03-02 ENCOUNTER — HEALTH MAINTENANCE LETTER (OUTPATIENT)
Age: 40
End: 2020-03-02

## 2020-05-11 ENCOUNTER — TELEPHONE (OUTPATIENT)
Dept: FAMILY MEDICINE | Facility: CLINIC | Age: 40
End: 2020-05-11

## 2020-05-11 NOTE — TELEPHONE ENCOUNTER
Called the patient and they downloaded the susie for his video visit on 5/12 w. Dr. Lind. Successful video test using txt #[phone]    Peg Anand CMA

## 2020-05-12 ENCOUNTER — VIRTUAL VISIT (OUTPATIENT)
Dept: FAMILY MEDICINE | Facility: CLINIC | Age: 40
End: 2020-05-12
Payer: COMMERCIAL

## 2020-05-12 DIAGNOSIS — K59.00 CONSTIPATION, UNSPECIFIED CONSTIPATION TYPE: ICD-10-CM

## 2020-05-12 DIAGNOSIS — B00.9 HERPES SIMPLEX VIRUS (HSV) INFECTION: ICD-10-CM

## 2020-05-12 DIAGNOSIS — J02.9 VIRAL PHARYNGITIS: Primary | ICD-10-CM

## 2020-05-12 RX ORDER — BISACODYL 10 MG
10 SUPPOSITORY, RECTAL RECTAL DAILY PRN
Qty: 10 SUPPOSITORY | Refills: 1 | Status: SHIPPED | OUTPATIENT
Start: 2020-05-12 | End: 2021-07-19

## 2020-05-12 RX ORDER — POLYETHYLENE GLYCOL 3350 17 G/17G
1 POWDER, FOR SOLUTION ORAL 2 TIMES DAILY
Qty: 507 G | Refills: 1 | Status: SHIPPED | OUTPATIENT
Start: 2020-05-12 | End: 2021-07-19

## 2020-05-12 RX ORDER — VALACYCLOVIR HYDROCHLORIDE 500 MG/1
500 TABLET, FILM COATED ORAL DAILY
Qty: 90 TABLET | Refills: 3 | Status: SHIPPED | OUTPATIENT
Start: 2020-05-12 | End: 2021-07-08

## 2020-05-12 ASSESSMENT — ENCOUNTER SYMPTOMS
SHORTNESS OF BREATH: 0
DIARRHEA: 0
MYALGIAS: 0
COUGH: 0
RHINORRHEA: 1
NUMBNESS: 0
CHILLS: 0
DIZZINESS: 0
SORE THROAT: 1
LIGHT-HEADEDNESS: 0
NAUSEA: 0
CONSTIPATION: 1
VOMITING: 0
SINUS PRESSURE: 0
FEVER: 0
SINUS PAIN: 0
HEADACHES: 0
WEAKNESS: 0

## 2020-05-12 NOTE — PROGRESS NOTES
"Family Medicine Video Visit Note  Toribio is being evaluated via a billable video visit.             Video Visit Consent     Patient was verbally read the following and verbal consent was obtained.  \"This video visit will be conducted via a call between you and your physician/provider. We have found that certain health care needs can be provided without the need for an in-person physical exam.  This service lets us provide the care you need with a video conversation.  If a prescription is necessary we can send it directly to your pharmacy.  If lab work is needed we can place an order for that and you can then stop by our lab to have the test done at a later time.    If during the course of the call the physician/provider feels a video visit is not appropriate, you will not be charged for this service.\"     (Name person giving consent:  Patient   Date verbal consent given:  5/12/2020  Time verbal consent given:  1:09 PM)    Patient would like the video invitation sent by: Text to cell phone: [phone]       No chief complaint on file.    Current Outpatient Medications   Medication Sig Dispense Refill     isoniazid (NYDRAZID) 300 MG tablet Take 1 tablet (300 mg) by mouth daily 90 tablet 0     multivitamin, therapeutic with minerals (MULTI-VITAMIN) TABS tablet Take 1 tablet by mouth daily (Patient not taking: Reported on 5/21/2019) 100 tablet 3     PARoxetine (PAXIL) 20 MG tablet Take 1 tablet (20 mg) by mouth every morning (Patient not taking: Reported on 5/21/2019) 60 tablet 1     psyllium (METAMUCIL/KONSYL) 58.6 % powder Take 18 g (1 Tablespoonful) by mouth daily 425 g 0     pyridoxine (VITAMIN B-6) 50 MG TABS Take 1 tablet (50 mg) by mouth daily 90 tablet 3     ranitidine (ZANTAC) 75 MG tablet Take 1 tablet (75 mg) by mouth 2 times daily 30 tablet 3     valACYclovir (VALTREX) 1000 mg tablet Take 2 tablets (2,000 mg) by mouth 2 times daily for 1 day 4 tablet 0     valACYclovir (VALTREX) 500 MG tablet Take 1 " "tablet (500 mg) by mouth daily 90 tablet 3     No Known Allergies             HPI     Video Start Time: 1:26 PM    Toribio presents to clinic today for the following health issues: sore throat    Patient is a generally healthy 40-year-old male who has a video visit today to discuss a sore throat.  Sore throat or has not ongoing for about a week, and does seem to be getting a little bit better.  He is reporting a runny nose, sneezing, intermittent cough.  He has not had any fevers or chills, and has not had any loss of taste.  Nobody else around him has been ill.  He also denies any chest pain or shortness of breath.    As a separate issue, patient states that he has not had a substantial bowel movement for about a week, and has tried an enema.           Review of Systems:     Review of Systems   Constitutional: Negative for chills and fever.   HENT: Positive for rhinorrhea, sneezing and sore throat. Negative for congestion, sinus pressure and sinus pain.    Eyes: Negative for visual disturbance.   Respiratory: Negative for cough and shortness of breath.    Cardiovascular: Negative for chest pain.   Gastrointestinal: Positive for constipation. Negative for diarrhea, nausea and vomiting.   Musculoskeletal: Negative for myalgias.   Skin: Negative for rash.   Neurological: Negative for dizziness, weakness, light-headedness, numbness and headaches.              Physical Exam:     There were no vitals taken for this visit.  Estimated body mass index is 23.96 kg/m  as calculated from the following:    Height as of 7/11/19: 1.727 m (5' 8\").    Weight as of 7/11/19: 71.5 kg (157 lb 9.6 oz).    GENERAL: healthy, alert and no distress  EYES: Eyes grossly normal to inspection, conjunctivae and sclerae normal  HENT: external ears, nose, lips appear normal  NECK: no asymmetry  RESP: No audible wheezes.   NEURO: Normal tone, mentation intact and speech normal            Assessment and Plan   Toribio was seen today for throat " problem, constipation and refill request.    Diagnoses and all orders for this visit:    Viral pharyngitis  Patient has had ongoing viral symptoms with sore throat, rhinorrhea, sneezing for about a week.  He does get improvement with Cepacol cough drops.  He has not tried salt water gargles or honey water.  I suggested these things to him, as well as over-the-counter cough syrups.  Overall, I stated that this will likely go away on its own.  He should isolate himself as much as possible and avoid contact with others.  It sounds unlikely that this is the coronavirus, but he was given coronavirus precautions.    Constipation, unspecified constipation type  Patient was reporting constipation for the last week, stating he has not had a substantial bowel movement within that time.  He is currently fasting for Ramadan, and is not able to eat or drink anything during the day.  I had suggested to him that he try MiraLAX twice daily, once in the morning before fasting, and once in the evening after breaking fast.  In addition, I have given him a prescription for suppositories as these may be easier to utilize in the [place] enemas.  -     polyethylene glycol (MIRALAX) 17 GM/SCOOP powder; Take 17 g (1 capful) by mouth 2 times daily  -     bisacodyl (DULCOLAX) 10 MG suppository; Place 1 suppository (10 mg) rectally daily as needed for constipation    Herpes simplex virus (HSV) infection  Patient has a history of herpes simplex virus, and is given a refill of valacyclovir today.  -     valACYclovir (VALTREX) 500 MG tablet; Take 1 tablet (500 mg) by mouth daily        Refilled medications that would be required in the next 3 months.     After Visit Information:  Will print and leave at     No follow-ups on file.      Video-Visit Details    Type of service:  Video Visit    Video End Time (time video stopped): 1:49 PM    Originating Location (pt. Location): Home    Distant Location (provider location):  [facility]     Mode of Communication:  Video Conference via [facility]      Diamond Lind MD PGY-1  [facility]  Patient was precepted today with Dr. Francois        .

## 2020-05-12 NOTE — PATIENT INSTRUCTIONS
Here is the plan from today's visit    1. Viral pharyngitis  Please try to quarantine if possible! If you were to develop any trouble breathing, high fevers, or otherwise feel like you are significantly worsening, please seek emergency care. We could also try a nasal spray if you would like. Please call us if you would like to try this.    2. Constipation, unspecified constipation type  - polyethylene glycol (MIRALAX) 17 GM/SCOOP powder; Take 17 g (1 capful) by mouth 2 times daily  Dispense: 507 g; Refill: 1  - bisacodyl (DULCOLAX) 10 MG suppository; Place 1 suppository (10 mg) rectally daily as needed for constipation  Dispense: 10 suppository; Refill: 1    3. Herpes simplex virus (HSV) infection  - valACYclovir (VALTREX) 500 MG tablet; Take 1 tablet (500 mg) by mouth daily  Dispense: 90 tablet; Refill: 3      Please call or return to clinic if your symptoms don't go away.    Follow up plan  Please make a clinic appointment for follow up with your primary physician Priscila Abad MD as needed.     Thank you for coming to [facility] today.  Lab Testing:  **If you had lab testing today and your results are reassuring or normal they will be mailed to you or sent through [employer] within 7 days.   **If the lab tests need quick action we will call you with the results.  The phone number we will call with results is # [phone] (home) . If this is not the best number please call our clinic and change the number.  Medication Refills:  If you need any refills please call your pharmacy and they will contact us.   If you need to  your refill at a new pharmacy, please contact the new pharmacy directly. The new pharmacy will help you get your medications transferred faster.   Scheduling:  If you have any concerns about today's visit or wish to schedule another appointment please call our office during normal business hours [phone] (8-5:00 M-F)  If a referral was made to a [facility]  and you don't get a call from central scheduling please call [phone].  If a Mammogram was ordered for you at [facility] call [phone] to schedule or change your appointment.  If you had an XRay/CT/Ultrasound/MRI ordered the number is [phone] to schedule or change your radiology appointment.   Medical Concerns:  If you have urgent medical concerns please call [phone] at any time of the day.    Diamond Lind MD    Patient Education     Viral Pharyngitis (Sore Throat)    You or your child have pharyngitis (sore throat). This infection is caused by a virus. It can cause throat pain that is worse when swallowing, aching all over, headache, and fever. The infection may be spread by coughing, kissing, or touching others after touching your mouth or nose. Antibiotic medicines do not work against viruses. They are not used for treating this illness.  Home care    If symptoms are severe, you or your child should rest at home. Return to work or school when you or your child feel well enough.     You or your child should drink plenty of fluids to prevent dehydration.    Use throat lozenges or numbing throat sprays to help reduce pain. Gargling with warm salt water will also help reduce throat pain. Dissolve 1/2 teaspoon of salt in 1 glass of warm water. Children can sip on juice or a popsicle. Children 5 years and older can also suck on a lollipop or hard candy.    Don t eat salty or spicy foods or give them to your child. These can be irritating to the throat.  Medicines for a child: You can give your child acetaminophen for fever, fussiness, or discomfort. In babies over 6 months of age, you may use ibuprofen instead of acetaminophen. If your child has chronic liver or kidney disease or ever had a stomach ulcer or GI bleeding, talk with your child s healthcare provider before giving these medicines. Aspirin should never be used by any child under 18 years of age who has a fever. It may cause  severe liver damage.  Medicines for an adult: You may use acetaminophen or ibuprofen to control pain or fever, unless another medicine was prescribed for this. If you have chronic liver or kidney disease or ever had a stomach ulcer or GI bleeding, talk with your healthcare provider before using these medicines.  Follow-up care  Follow up with a healthcare provider or our staff if you or your child are not getting better over the next week.  When to seek medical advice  Call your healthcare provider right away if any of these occur:    Fever as directed by your healthcare provider.  For children, seek care if:  ? Your child is of any age and has repeated fevers above 104 F (40 C).  ? Your child is younger than 2 years of age and has a fever of 100.4 F (38 C) for more than 1 day.  ? Your child is 2 years old or older and has a fever of 100.4 F (38 C) for more than 3 days.    New or worsening ear pain, sinus pain, or headache    Painful lumps in the back of neck    Stiff neck    Lymph nodes are getting larger    Can t swallow liquids, a lot of drooling, or can t open mouth wide due to throat pain    Signs of dehydration, such as very dark urine or no urine, sunken eyes, dizziness    Trouble breathing or noisy breathing    Muffled voice    New rash    Other symptoms are getting worse  Date Last Reviewed: 10/1/2017    4450-8771 [employer]. [address] [place] [address], [place] [address]. All rights reserved. This information is not intended as a substitute for professional medical care. Always follow your healthcare professional's instructions.

## 2020-12-20 ENCOUNTER — HEALTH MAINTENANCE LETTER (OUTPATIENT)
Age: 40
End: 2020-12-20

## 2021-04-24 ENCOUNTER — HEALTH MAINTENANCE LETTER (OUTPATIENT)
Age: 41
End: 2021-04-24

## 2021-07-08 DIAGNOSIS — B00.9 HERPES SIMPLEX VIRUS (HSV) INFECTION: ICD-10-CM

## 2021-07-08 RX ORDER — VALACYCLOVIR HYDROCHLORIDE 500 MG/1
500 TABLET, FILM COATED ORAL DAILY
Qty: 30 TABLET | Refills: 0 | Status: SHIPPED | OUTPATIENT
Start: 2021-07-08 | End: 2021-07-18

## 2021-07-08 NOTE — TELEPHONE ENCOUNTER
Valtrex 500mg      LAST FILLED DATE: 06-  LAST FILLED STRENGTH: 500 MG  LAST FILLED QTY: 30  LAST OFFICE VISIT: 05-    Abiola ARSHAD CPhT @    [facility]  [address] [place] [address]  Phone: [phone]  Fax: [phone]

## 2021-07-08 NOTE — TELEPHONE ENCOUNTER
"Prescribed one month refill of valtrex for suppressive therapy of HSV. Patient needs clinic visit with any provider re: \"valtrex refill\" prior to future refills.     Routed to Triage RN and  to assist in scheduling appointment.     Priscila Abad MD   "

## 2021-07-09 NOTE — TELEPHONE ENCOUNTER
RN spoke to patient and relayed message below. He verbalized understanding- requested appointment for himself and his fiance. RN warm transferred to  to assist with scheduling.   Allie Oneal RN

## 2021-07-18 PROBLEM — A04.8 HELICOBACTER PYLORI INFECTION: Status: ACTIVE | Noted: 2017-01-24

## 2021-07-18 NOTE — PROGRESS NOTES
Assessment & Plan     Toribio is a 42 yo that presents to clinic for STI testing. He is asymptomatic but is pursuing testing due to an upcoming marriage to a new female partner.    Herpes simplex virus (HSV) infection  The patient states a history of oral herpes and had been taking daily valacyclovir. He does not endorse any recent flare ups and would prefer medication that he only needs to take when he has a flare up. He is prescribed prn valacyclovir, with the caveat that if he is having frequent flares, would recommend continuous valacyclovir. Instructed to return if he is needing frequent refills of short term course of valacyclovir.     - valACYclovir (VALTREX) 500 MG tablet  Dispense: 6 tablet; Refill: 4    Screen for STD (sexually transmitted disease)  Standard STI screening is ordered secondary to patient's sexual history limited to female partners. Did not discuss risk of oral sex.     - Neisseria gonorrhoeae PCR  - Chlamydia trachomatis PCR  - Treponema Abs w Reflex to RPR and Titer    Screening for HIV without presence of risk factors  Encounter for hepatitis C screening test for low risk patient  The patient agreed to HIV and hep C one time screening recommendation for low risk patients.    - HIV Antigen Antibody Combo  - Hepatitis C antibody    Slow transit constipation  The patient complains of constipation without relief from miralax or dulcolax. Will try psyllium.     - psyllium (METAMUCIL/KONSYL) 58.6 % powder  Dispense: 660 g; Refill: 3    Nonimmune to hepatitis B virus  The patient is not immune to hep B and no evidence of Hep B vaccination since 5/10/18 lab results. Will test Hep B surface antigen today.    - Hepatitis B surface antigen      No follow-ups on file.    Kaden Adam, MS3  I was present with the medical student who participated in the service and in the documentation of this note. I have verified the history and personally performed the physical exam and medical decision making, and  have verified the content of the note, which accurately reflects my assessment of the patient and the plan of care.    Diamond Lind MD  [facility] GENA Rooney is a 41 year old who presents for the following health issues   Chief Complaint   Patient presents with     STD     blood work       HPI   Toribio is a 40 yo male that presents to clinic for STI screening. He was last screened in 2019 and has been sexually active since then with a female partner. He does not use barrier protection. He is asymptomatic with no genital rash, purulence, or testicular pain. He is   and getting remarried and would like to get STI screening before getting remarried just in case.    Has had the covid19 vaccine and this was reconciled in the care gaps. He also endorses constipation without relief from miralax or dulcolax.       Review of Systems   Constitutional, HEENT, cardiovascular, pulmonary, gi and gu systems are negative, except as otherwise noted.        Objective    BP 96/69   Pulse 72   Temp 97.9  F (36.6  C) (Oral)   Resp 16   Wt 72.9 kg (160 lb 12.8 oz)   SpO2 96%   BMI 24.45 kg/m    Body mass index is 24.45 kg/m .     Physical Exam  Vitals reviewed.   Constitutional:       Appearance: Normal appearance. He is normal weight.   HENT:      Head: Normocephalic.   Cardiovascular:      Rate and Rhythm: Normal rate.      Heart sounds: Normal heart sounds.   Pulmonary:      Effort: Pulmonary effort is normal.   Skin:     General: Skin is warm and dry.   Neurological:      Mental Status: He is alert and oriented to person, place, and time.   Psychiatric:         Mood and Affect: Mood normal.         Behavior: Behavior normal.          This office note has been dictated.

## 2021-07-19 ENCOUNTER — OFFICE VISIT (OUTPATIENT)
Dept: FAMILY MEDICINE | Facility: CLINIC | Age: 41
End: 2021-07-19
Payer: COMMERCIAL

## 2021-07-19 VITALS
DIASTOLIC BLOOD PRESSURE: 69 MMHG | SYSTOLIC BLOOD PRESSURE: 96 MMHG | HEART RATE: 72 BPM | OXYGEN SATURATION: 96 % | WEIGHT: 160.8 LBS | TEMPERATURE: 97.9 F | BODY MASS INDEX: 24.45 KG/M2 | RESPIRATION RATE: 16 BRPM

## 2021-07-19 DIAGNOSIS — B00.9 HERPES SIMPLEX VIRUS (HSV) INFECTION: ICD-10-CM

## 2021-07-19 DIAGNOSIS — K59.01 SLOW TRANSIT CONSTIPATION: ICD-10-CM

## 2021-07-19 DIAGNOSIS — Z11.3 SCREEN FOR STD (SEXUALLY TRANSMITTED DISEASE): Primary | ICD-10-CM

## 2021-07-19 DIAGNOSIS — Z11.4 SCREENING FOR HIV WITHOUT PRESENCE OF RISK FACTORS: ICD-10-CM

## 2021-07-19 DIAGNOSIS — Z11.59 ENCOUNTER FOR HEPATITIS C SCREENING TEST FOR LOW RISK PATIENT: ICD-10-CM

## 2021-07-19 DIAGNOSIS — Z78.9 NONIMMUNE TO HEPATITIS B VIRUS: ICD-10-CM

## 2021-07-19 PROCEDURE — 86780 TREPONEMA PALLIDUM: CPT | Performed by: STUDENT IN AN ORGANIZED HEALTH CARE EDUCATION/TRAINING PROGRAM

## 2021-07-19 PROCEDURE — 87491 CHLMYD TRACH DNA AMP PROBE: CPT | Performed by: STUDENT IN AN ORGANIZED HEALTH CARE EDUCATION/TRAINING PROGRAM

## 2021-07-19 PROCEDURE — 87591 N.GONORRHOEAE DNA AMP PROB: CPT | Performed by: STUDENT IN AN ORGANIZED HEALTH CARE EDUCATION/TRAINING PROGRAM

## 2021-07-19 PROCEDURE — 99214 OFFICE O/P EST MOD 30 MIN: CPT | Mod: GC | Performed by: STUDENT IN AN ORGANIZED HEALTH CARE EDUCATION/TRAINING PROGRAM

## 2021-07-19 PROCEDURE — 87389 HIV-1 AG W/HIV-1&-2 AB AG IA: CPT | Performed by: STUDENT IN AN ORGANIZED HEALTH CARE EDUCATION/TRAINING PROGRAM

## 2021-07-19 PROCEDURE — 36415 COLL VENOUS BLD VENIPUNCTURE: CPT | Performed by: STUDENT IN AN ORGANIZED HEALTH CARE EDUCATION/TRAINING PROGRAM

## 2021-07-19 PROCEDURE — 86803 HEPATITIS C AB TEST: CPT | Performed by: STUDENT IN AN ORGANIZED HEALTH CARE EDUCATION/TRAINING PROGRAM

## 2021-07-19 RX ORDER — VALACYCLOVIR HYDROCHLORIDE 500 MG/1
500 TABLET, FILM COATED ORAL 2 TIMES DAILY
Qty: 6 TABLET | Refills: 4 | Status: SHIPPED | OUTPATIENT
Start: 2021-07-19 | End: 2021-07-22

## 2021-07-19 NOTE — PATIENT INSTRUCTIONS
Patient Education   Here is the plan from today's visit    1. Herpes simplex virus (HSV) infection  - valACYclovir (VALTREX) 500 MG tablet; Take 1 tablet (500 mg) by mouth 2 times daily for 3 days When you have a flare  Dispense: 6 tablet; Refill: 4    2. Screen for STD (sexually transmitted disease)  - Neisseria gonorrhoeae PCR; Future  - Chlamydia trachomatis PCR; Future  - Treponema Abs w Reflex to RPR and Titer; Future    3. Screening for HIV without presence of risk factors  - HIV Antigen Antibody Combo; Future    4. Encounter for hepatitis C screening test for low risk patient  - Hepatitis C antibody; Future    5. Slow transit constipation  - psyllium (METAMUCIL/KONSYL) 58.6 % powder; Take 15 g by mouth daily  Dispense: 660 g; Refill: 3      Please call or return to clinic if your symptoms don't go away.    Follow up plan  No follow-ups on file.    Thank you for coming to [facility] today.  COVID-19 Vaccine:  If you are eligible for the COVID-19 vaccine, you can schedule via [employer] or call [facility] at [address] [place] [address]. If you need assistance with scheduling, please speak to a Care Coordinator or your provider.   Lab Testing:  **If you had lab testing today and your results are reassuring or normal they will be mailed to you or sent through [employer] within 7 days.   **If the lab tests need quick action we will call you with the results.  **If you are having labs done on a different day, please call [phone] to schedule at [facility] or [phone] for other [facility] locations.   The phone number we will call with results is # [phone] (home) . If this is not the best number please call our clinic and change the number.  Medication Refills:  If you need any refills please call your pharmacy and they will contact us.   If you need to  your refill at a new pharmacy, please contact the new pharmacy directly. The new pharmacy will help you get your medications  transferred faster.   Scheduling:  If you have any concerns about today's visit or wish to schedule another appointment please call our office during normal business hours [phone] (8-5:00 M-F)  If a referral was made to a [facility] and you don't get a call from central scheduling please call [phone].  If a Mammogram was ordered for you at [facility] call [phone] to schedule or change your appointment.  If you had an EKG/XRay/CT/Ultrasound/MRI ordered the number is [phone] to schedule or change your radiology appointment.   Medical Concerns:  If you have urgent medical concerns please call [phone] at any time of the day.    Diamond Lind MD       Mychart:  Username: [email or web address]  Password: [email or web address]

## 2021-07-20 LAB
C TRACH DNA SPEC QL NAA+PROBE: NEGATIVE
HCV AB SERPL QL IA: NONREACTIVE
HIV 1+2 AB+HIV1 P24 AG SERPL QL IA: NONREACTIVE
N GONORRHOEA DNA SPEC QL NAA+PROBE: NEGATIVE
T PALLIDUM AB SER QL: NONREACTIVE

## 2021-07-20 NOTE — PROGRESS NOTES
Preceptor Attestation:    I discussed the patient with the resident and evaluated the patient in person. I have verified the content of the note, which accurately reflects my assessment of the patient and the plan of care.   Supervising Physician:  Alexandria Larson MD.

## 2021-10-03 ENCOUNTER — HEALTH MAINTENANCE LETTER (OUTPATIENT)
Age: 41
End: 2021-10-03

## 2021-11-10 ENCOUNTER — HOSPITAL ENCOUNTER (EMERGENCY)
Facility: CLINIC | Age: 41
Discharge: HOME | End: 2021-11-10
Attending: EMERGENCY MEDICINE | Admitting: EMERGENCY MEDICINE
Payer: COMMERCIAL

## 2021-11-10 VITALS
OXYGEN SATURATION: 98 % | WEIGHT: 160 LBS | TEMPERATURE: 97.8 F | BODY MASS INDEX: 24.33 KG/M2 | SYSTOLIC BLOOD PRESSURE: 123 MMHG | DIASTOLIC BLOOD PRESSURE: 64 MMHG | RESPIRATION RATE: 18 BRPM | HEART RATE: 92 BPM

## 2021-11-10 DIAGNOSIS — K64.4 EXTERNAL HEMORRHOIDS: ICD-10-CM

## 2021-11-10 PROCEDURE — 99283 EMERGENCY DEPT VISIT LOW MDM: CPT

## 2021-11-10 RX ORDER — HYDROCORTISONE 25 MG/G
CREAM TOPICAL
Qty: 28 G | Refills: 0 | Status: SHIPPED | OUTPATIENT
Start: 2021-11-10 | End: 2023-07-28

## 2021-11-10 ASSESSMENT — ENCOUNTER SYMPTOMS
RECTAL PAIN: 1
CONSTIPATION: 1

## 2022-04-04 NOTE — NURSING NOTE
used this visit:  Name: Christian Moss  Language: Botswanan  Agency: [employer]  Phone:[phone]  Peg Anand     patient unsure of vaccine that he received - states that he did receive the vaccine

## 2022-05-15 ENCOUNTER — HEALTH MAINTENANCE LETTER (OUTPATIENT)
Age: 42
End: 2022-05-15

## 2022-09-10 ENCOUNTER — HEALTH MAINTENANCE LETTER (OUTPATIENT)
Age: 42
End: 2022-09-10

## 2023-06-03 ENCOUNTER — HEALTH MAINTENANCE LETTER (OUTPATIENT)
Age: 43
End: 2023-06-03

## 2023-07-28 ENCOUNTER — OFFICE VISIT (OUTPATIENT)
Dept: FAMILY MEDICINE | Facility: CLINIC | Age: 43
End: 2023-07-28
Payer: COMMERCIAL

## 2023-07-28 VITALS
BODY MASS INDEX: 26.65 KG/M2 | WEIGHT: 169.8 LBS | OXYGEN SATURATION: 99 % | RESPIRATION RATE: 17 BRPM | HEIGHT: 67 IN | SYSTOLIC BLOOD PRESSURE: 108 MMHG | DIASTOLIC BLOOD PRESSURE: 74 MMHG | HEART RATE: 65 BPM

## 2023-07-28 DIAGNOSIS — Z00.00 HEALTHCARE MAINTENANCE: ICD-10-CM

## 2023-07-28 DIAGNOSIS — Z00.00 ROUTINE GENERAL MEDICAL EXAMINATION AT A HEALTH CARE FACILITY: Primary | ICD-10-CM

## 2023-07-28 PROCEDURE — 36415 COLL VENOUS BLD VENIPUNCTURE: CPT

## 2023-07-28 PROCEDURE — 99396 PREV VISIT EST AGE 40-64: CPT | Mod: GC

## 2023-07-28 PROCEDURE — 86706 HEP B SURFACE ANTIBODY: CPT

## 2023-07-28 PROCEDURE — 80061 LIPID PANEL: CPT

## 2023-07-28 PROCEDURE — 80048 BASIC METABOLIC PNL TOTAL CA: CPT

## 2023-07-28 RX ORDER — MULTIPLE VITAMINS W/ MINERALS TAB 9MG-400MCG
1 TAB ORAL DAILY
Qty: 90 TABLET | Refills: 3 | Status: SHIPPED | OUTPATIENT
Start: 2023-07-28 | End: 2023-08-17

## 2023-07-28 ASSESSMENT — ENCOUNTER SYMPTOMS
NERVOUS/ANXIOUS: 0
DIARRHEA: 0
HEMATURIA: 0
WEAKNESS: 0
JOINT SWELLING: 0
COUGH: 0
EYE PAIN: 0
DYSURIA: 0
HEMATOCHEZIA: 0
MYALGIAS: 0
PALPITATIONS: 0
HEARTBURN: 0
FEVER: 0
CONSTIPATION: 0
PARESTHESIAS: 0
CHILLS: 0
FREQUENCY: 0
ABDOMINAL PAIN: 0
HEADACHES: 0
ARTHRALGIAS: 0
SORE THROAT: 0
SHORTNESS OF BREATH: 0
NAUSEA: 0
DIZZINESS: 0

## 2023-07-28 NOTE — PATIENT INSTRUCTIONS
Preventive Health Recommendations  Male Ages 40 to 49    Yearly exam:             See your health care provider every year in order to  o   Review health changes.   o   Discuss preventive care.    o   Review your medicines if your doctor has prescribed any.  You should be tested each year for STDs (sexually transmitted diseases) if you re at risk.   Have a cholesterol test every 5 years.   Have a colonoscopy (test for colon cancer) if someone in your family has had colon cancer or polyps before age 50.   After age 45, have a diabetes test (fasting glucose). If you are at risk for diabetes, you should have this test every 3 years.    Talk with your health care provider about whether or not a prostate cancer screening test (PSA) is right for you.    Shots: Get a flu shot each year. Get a tetanus shot every 10 years.     Nutrition:  Eat at least 5 servings of fruits and vegetables daily.   Eat whole-grain bread, whole-wheat pasta and brown rice instead of white grains and rice.   Get adequate Calcium and Vitamin D.     Lifestyle  Exercise for at least 150 minutes a week (30 minutes a day, 5 days a week). This will help you control your weight and prevent disease.   Limit alcohol to one drink per day.   No smoking.   Wear sunscreen to prevent skin cancer.   See your dentist every six months for an exam and cleaning.      Patient Education   Here is the plan from today's visit    1. Healthcare maintenance    - Lipid panel; Future  - multivitamin w/minerals (MULTIVITAMINS W/MINERALS) tablet; Take 1 tablet by mouth daily  Dispense: 90 tablet; Refill: 3  - Hepatitis B Surface Antibody; Future    2. Routine general medical examination at a health care facility    - Basic Metabolic Panel; Future  - Lipid panel reflex to direct LDL Fasting; Future      Please call or return to clinic if your symptoms don't go away.    Follow up plan  Return in about 1 year (around 7/28/2024).    Thank you for coming to [facility]  today.  Lab Testing:  **If you had lab testing today and your results are reassuring or normal they will be mailed to you or sent through [employer] within 7 days.   **If the lab tests need quick action we will call you with the results.  **If you are having labs done on a different day, please call [phone] to schedule at [facility] or [phone] for other [facility] locations. Labs do not offer walk-in appointments.  The phone number we will call with results is # [phone] (home) . If this is not the best number please call our clinic and change the number.  Medication Refills:  If you need any refills please call your pharmacy and they will contact us.   If you need to  your refill at a new pharmacy, please contact the new pharmacy directly. The new pharmacy will help you get your medications transferred faster.   Scheduling:  If you have any concerns about today's visit or wish to schedule another appointment please call our office during normal business hours [phone] (8-5:00 M-F). If you can no longer make a scheduled visit, please cancel via [employer] or call us to cancel.   If a referral was made to an [facility] specialty provider and you do not get a call from central scheduling, please refer to directions on your visit summary or call our office during normal business hours for assistance.   If a Mammogram was ordered for you at [facility] call [phone] to schedule or change your appointment.  If you had an XRay/CT/Ultrasound/MRI ordered the number is [phone] to schedule or change your radiology appointment.   [facility] has limited ultrasound appointments available on Wednesdays, if you would like your ultrasound at [facility], please call [phone] to schedule.   Medical Concerns:  If you have urgent medical concerns please call [phone] at any time of the day.    Ania Lewis MD

## 2023-07-28 NOTE — PROGRESS NOTES
SUBJECTIVE:   CC: Toribio is an 43 year old who presents for preventative health visit.       7/28/2023     1:11 PM   Additional Questions   Roomed by Doua   Accompanied by Self         7/28/2023     1:11 PM   Patient Reported Additional Medications   Patient reports taking the following new medications n/a       Healthy Habits:     Getting at least 3 servings of Calcium per day:  Yes    Bi-annual eye exam:  NO    Dental care twice a year:  NO    Sleep apnea or symptoms of sleep apnea:  None    Diet:  Regular (no restrictions)    Frequency of exercise:  1 day/week    Duration of exercise:  15-30 minutes    Taking medications regularly:  Yes    Medication side effects:  Not applicable    Additional concerns today:  No  For exercises typically walks  Sometimes uses home gym  Diet - feels irregular loves sugar.  Drinks about two bottles of water daily. Does find has to use bathroom. Has to urinate 3-4x per day. So far today (it is 1337) has used bathroom 2x  Works for [employer] so hard if has to use bathroom often.  Feels overall well that is health, but like to lose weight.    No polyuria, no polyphagia, no early satiety.            Have you ever done Advance Care Planning? (For example, a Health Directive, POLST, or a discussion with a medical provider or your loved ones about your wishes): No, advance care planning information given to patient to review.  Patient plans to discuss their wishes with loved ones or provider.      Social History     Tobacco Use    Smoking status: Never    Smokeless tobacco: Never   Substance Use Topics    Alcohol use: No             7/28/2023     1:16 PM   Alcohol Use   Prescreen: >3 drinks/day or >7 drinks/week? No       Last PSA:   PSA   Date Value Ref Range Status   06/27/2019 0.79 0 - 4 ug/L Final     Comment:     Assay Method:  Chemiluminescence using Siemens Vista analyzer       Reviewed orders with patient. Reviewed health maintenance and updated orders accordingly - Yes      Reviewed  "and updated as needed this visit by clinical staff   Tobacco  Allergies  Meds              Reviewed and updated as needed this visit by Provider                 No past medical history on file.   Past Surgical History:   Procedure Laterality Date    BACK SURGERY  4/2016    EXCISE MASS BACK N/A 2/11/2016    Procedure: EXCISE MASS BACK;  Surgeon: Ernestina Calle MD;  Location: [facility] OR       Review of Systems   Constitutional:  Negative for chills and fever.   HENT:  Negative for congestion, ear pain, hearing loss and sore throat.    Eyes:  Negative for pain and visual disturbance.   Respiratory:  Negative for cough and shortness of breath.    Cardiovascular:  Negative for chest pain, palpitations and peripheral edema.   Gastrointestinal:  Negative for abdominal pain, constipation, diarrhea, heartburn, hematochezia and nausea.   Genitourinary:  Negative for dysuria, frequency, genital sores, hematuria, impotence, penile discharge and urgency.   Musculoskeletal:  Negative for arthralgias, joint swelling and myalgias.   Skin:  Negative for rash.   Neurological:  Negative for dizziness, weakness, headaches and paresthesias.   Psychiatric/Behavioral:  Negative for mood changes. The patient is not nervous/anxious.        OBJECTIVE:   /74 (BP Location: Left arm, Patient Position: Sitting, Cuff Size: Adult Regular)   Pulse 65   Resp 17   Ht 1.71 m (5' 7.32\")   Wt 77 kg (169 lb 12.8 oz)   SpO2 99%   BMI 26.34 kg/m      Physical Exam  GENERAL: healthy, alert and no distress  NECK: no adenopathy, no asymmetry, masses, or scars and thyroid normal to palpation  RESP: lungs clear to auscultation - no rales, rhonchi or wheezes  CV: regular rate and rhythm, normal S1 S2, no S3 or S4, no murmur, click or rub, no peripheral edema and peripheral pulses strong  ABDOMEN: soft, nontender, no hepatosplenomegaly, no masses and bowel sounds normal  MS: no gross musculoskeletal defects noted, no " "edema        ASSESSMENT/PLAN:   Toribio was seen today for physical.    Diagnoses and all orders for this visit:    Routine general medical examination at a health care facility  patient well appearing & healthy. Special attention given to dietary counseling today. Did discussed at length with patient that first recommendation at this time would be to increase diet and exercise. Patient activity level is not a goal of 150 minutes per week, but is rather 15 to 30 minutes per day. Did discussed with patient that increasing healthy diet, exercise, and watching caloric intake most likely to benefit for weight loss without possible side effects. Moreover, patient would not qualify for most weight loss medications at this time given BMI of 26.34.  -     Basic Metabolic Panel; Future  -     Lipid panel reflex to direct LDL Fasting; Future  -     Basic Metabolic Panel  -     Lipid panel reflex to direct LDL Fasting    Healthcare maintenance  -     Lipid panel; Future  -     Cancel: Basic metabolic panel; Future  -     multivitamin w/minerals (MULTIVITAMINS W/MINERALS) tablet; Take 1 tablet by mouth daily  -     Hepatitis B Surface Antibody; Future  -     Cancel: Lipid panel  -     Hepatitis B Surface Antibody    Patient has been advised of split billing requirements and indicates understanding: Yes      COUNSELING:   Reviewed preventive health counseling, as reflected in patient instructions       Regular exercise       Healthy diet/nutrition       Vision screening       Advance Care Planning      BMI:   Estimated body mass index is 26.34 kg/m  as calculated from the following:    Height as of this encounter: 1.71 m (5' 7.32\").    Weight as of this encounter: 77 kg (169 lb 12.8 oz).   Weight management plan: Discussed healthy diet and exercise guidelines      He reports that he has never smoked. He has never used smokeless tobacco.            Ania Lewis MD  [facility]  "

## 2023-07-28 NOTE — PROGRESS NOTES
Preceptor Attestation:  Patient seen and evaluated in person. I discussed the patient with the resident. I have verified the content of the note, which accurately reflects my assessment of the patient and the plan of care.   Supervising Physician:  Deja Lombardi DO

## 2023-07-29 LAB
ANION GAP SERPL CALCULATED.3IONS-SCNC: 8 MMOL/L (ref 7–15)
BUN SERPL-MCNC: 8.1 MG/DL (ref 6–20)
CALCIUM SERPL-MCNC: 9.2 MG/DL (ref 8.6–10)
CHLORIDE SERPL-SCNC: 100 MMOL/L (ref 98–107)
CHOLEST SERPL-MCNC: 195 MG/DL
CREAT SERPL-MCNC: 0.67 MG/DL (ref 0.67–1.17)
DEPRECATED HCO3 PLAS-SCNC: 30 MMOL/L (ref 22–29)
GFR SERPL CREATININE-BSD FRML MDRD: >90 ML/MIN/1.73M2
GLUCOSE SERPL-MCNC: 93 MG/DL (ref 70–99)
HBV SURFACE AB SERPL IA-ACNC: 0.35 M[IU]/ML
HBV SURFACE AB SERPL IA-ACNC: NONREACTIVE M[IU]/ML
HDLC SERPL-MCNC: 38 MG/DL
LDLC SERPL CALC-MCNC: 122 MG/DL
NONHDLC SERPL-MCNC: 157 MG/DL
POTASSIUM SERPL-SCNC: 4.3 MMOL/L (ref 3.4–5.3)
SODIUM SERPL-SCNC: 138 MMOL/L (ref 136–145)
TRIGL SERPL-MCNC: 175 MG/DL

## 2023-08-17 ENCOUNTER — OFFICE VISIT (OUTPATIENT)
Dept: FAMILY MEDICINE | Facility: CLINIC | Age: 43
End: 2023-08-17
Payer: COMMERCIAL

## 2023-08-17 VITALS
OXYGEN SATURATION: 96 % | WEIGHT: 170 LBS | TEMPERATURE: 98.1 F | BODY MASS INDEX: 26.37 KG/M2 | DIASTOLIC BLOOD PRESSURE: 70 MMHG | RESPIRATION RATE: 16 BRPM | SYSTOLIC BLOOD PRESSURE: 101 MMHG | HEART RATE: 63 BPM

## 2023-08-17 DIAGNOSIS — G89.29 CHRONIC BILATERAL LOW BACK PAIN WITHOUT SCIATICA: ICD-10-CM

## 2023-08-17 DIAGNOSIS — H61.23 BILATERAL IMPACTED CERUMEN: Primary | ICD-10-CM

## 2023-08-17 DIAGNOSIS — M54.50 CHRONIC BILATERAL LOW BACK PAIN WITHOUT SCIATICA: ICD-10-CM

## 2023-08-17 DIAGNOSIS — J30.2 SEASONAL ALLERGIC RHINITIS, UNSPECIFIED TRIGGER: ICD-10-CM

## 2023-08-17 DIAGNOSIS — Z00.00 HEALTHCARE MAINTENANCE: ICD-10-CM

## 2023-08-17 PROCEDURE — 69209 REMOVE IMPACTED EAR WAX UNI: CPT

## 2023-08-17 PROCEDURE — 99214 OFFICE O/P EST MOD 30 MIN: CPT | Mod: 25

## 2023-08-17 RX ORDER — MULTIPLE VITAMINS W/ MINERALS TAB 9MG-400MCG
1 TAB ORAL DAILY
Qty: 90 TABLET | Refills: 3 | Status: SHIPPED | OUTPATIENT
Start: 2023-08-17

## 2023-08-17 NOTE — Clinical Note
Can't close this resident note - says immunization admin is still pending. Pls assist, marquita ELLISON Impression: Long term (current) use of oral hypoglycemic drugs: Z79.84. Plan:  Discussed ocular and systemic benefits of maintaining blood sugar control.

## 2023-08-21 NOTE — PROGRESS NOTES
Assessment & Plan     Bilateral impacted cerumen  Patient with bilateral cerumen impaction s/p lavage which he tolerated well and afterwards had bilaterally visible Tms.  - offered information about debrox, which patient could use in the future for cerumen impaction   - patient informed NOT to use debrox for next several weeks    Seasonal allergic rhinitis, unspecified trigger  Patient with sxs c/f allergic rhinitis and recommended to start OTC cetirizine.   - consider Flonase if cetirizine ineffective    Healthcare maintenance  Patient request for multivitamin.   - multivitamin w/minerals (MULTIVITAMINS W/MINERALS) tablet; Take 1 tablet by mouth daily    Chronic bilateral low back pain without sciatica  Patient with chronic low back pain w/o c/f red flag features and benign exam.  - CTM  - back exercises in AVS  - recommend good posture and lumbar support when driving  - consider PT referral if worsening sxs     See Patient Instructions    No follow-ups on file.    Ania Lewis MD  [facility] GENA Rooney is a 43 year old, presenting for the following health issues:  Ear Problem (Pt here for ear wax)      HPI     Patient presents for following concerns:  Cerumen impaction  - ear lavage completed in clinic today  - patient tolerated well  - bilateral Tms visible s/p washout    Chronic Back Pain  - patient with occasional low back pains  - sits with poor posture often  - doesn't do any current low back exercises  - patient feels back pain does not limit acitivites but is annoying in frequently present    Itching  - patient with post nasal drip and occasional itching skin  - not clear exact which seasons are worst  - no hx of hives or anaphylaxis  - no rash        Objective    /70 (BP Location: Left arm, Patient Position: Sitting, Cuff Size: Adult Large)   Pulse 63   Temp 98.1  F (36.7  C) (Oral)   Resp 16   Wt 77.1 kg (170 lb)   SpO2 96%   BMI 26.37 kg/m    Body mass  index is 26.37 kg/m .  Physical Exam   GENERAL: healthy, alert and no distress  EARS: bilateral TMS pearly grey with light reflex (s/p cerumen washout)  RESP: lungs clear to auscultation - no rales, rhonchi or wheezes  CV: regular rate and rhythm, normal S1 S2, no S3 or S4, no murmur, click or rub, no peripheral edema and peripheral pulses strong  ABDOMEN: soft, nontender, no hepatosplenomegaly, no masses and bowel sounds normal  MS: no gross musculoskeletal defects noted, no edema  BACK:   ROM: flexion -full /extension -full /lateral rotation- full /side bend- full   Bony tenderness: None   Paraspinal tenderness: None.   Sensation: intact in b/l lower extremities.   Strength: 5/5 w/ knee flexion/ extension.   Maneuvers: Neg straight leg raise b/l.   Neuro: DTR's 2+.

## 2023-08-22 PROCEDURE — 90746 HEPB VACCINE 3 DOSE ADULT IM: CPT

## 2023-08-22 PROCEDURE — 90471 IMMUNIZATION ADMIN: CPT

## 2024-05-22 ENCOUNTER — HOSPITAL ENCOUNTER (EMERGENCY)
Facility: CLINIC | Age: 44
Discharge: HOME | End: 2024-05-23
Attending: EMERGENCY MEDICINE | Admitting: EMERGENCY MEDICINE
Payer: COMMERCIAL

## 2024-05-22 DIAGNOSIS — E87.1 HYPONATREMIA: ICD-10-CM

## 2024-05-22 DIAGNOSIS — R53.1 WEAKNESS: ICD-10-CM

## 2024-05-22 DIAGNOSIS — R20.0 NUMBNESS: ICD-10-CM

## 2024-05-22 PROCEDURE — 99285 EMERGENCY DEPT VISIT HI MDM: CPT | Mod: 25

## 2024-05-22 PROCEDURE — 93005 ELECTROCARDIOGRAM TRACING: CPT

## 2024-05-23 ENCOUNTER — APPOINTMENT (OUTPATIENT)
Dept: MRI IMAGING | Facility: CLINIC | Age: 44
End: 2024-05-23
Attending: EMERGENCY MEDICINE
Payer: COMMERCIAL

## 2024-05-23 VITALS
OXYGEN SATURATION: 98 % | TEMPERATURE: 97.8 F | WEIGHT: 180 LBS | HEIGHT: 72 IN | HEART RATE: 87 BPM | BODY MASS INDEX: 24.38 KG/M2 | RESPIRATION RATE: 18 BRPM | SYSTOLIC BLOOD PRESSURE: 123 MMHG | DIASTOLIC BLOOD PRESSURE: 82 MMHG

## 2024-05-23 LAB
ALBUMIN SERPL BCG-MCNC: 4.1 G/DL (ref 3.5–5.2)
ALP SERPL-CCNC: 72 U/L (ref 40–150)
ALT SERPL W P-5'-P-CCNC: 19 U/L (ref 0–70)
ANION GAP SERPL CALCULATED.3IONS-SCNC: 11 MMOL/L (ref 7–15)
AST SERPL W P-5'-P-CCNC: 22 U/L (ref 0–45)
ATRIAL RATE - MUSE: 70 BPM
BASOPHILS # BLD AUTO: 0 10E3/UL (ref 0–0.2)
BASOPHILS NFR BLD AUTO: 0 %
BILIRUB SERPL-MCNC: 0.3 MG/DL
BUN SERPL-MCNC: 13.9 MG/DL (ref 6–20)
CALCIUM SERPL-MCNC: 9.1 MG/DL (ref 8.6–10)
CHLORIDE SERPL-SCNC: 98 MMOL/L (ref 98–107)
CREAT SERPL-MCNC: 0.87 MG/DL (ref 0.67–1.17)
DEPRECATED HCO3 PLAS-SCNC: 24 MMOL/L (ref 22–29)
DIASTOLIC BLOOD PRESSURE - MUSE: NORMAL MMHG
EGFRCR SERPLBLD CKD-EPI 2021: >90 ML/MIN/1.73M2
EOSINOPHIL # BLD AUTO: 0.1 10E3/UL (ref 0–0.7)
EOSINOPHIL NFR BLD AUTO: 2 %
ERYTHROCYTE [DISTWIDTH] IN BLOOD BY AUTOMATED COUNT: 11.7 % (ref 10–15)
GLUCOSE SERPL-MCNC: 123 MG/DL (ref 70–99)
HCT VFR BLD AUTO: 44.4 % (ref 40–53)
HGB BLD-MCNC: 15.8 G/DL (ref 13.3–17.7)
IMM GRANULOCYTES # BLD: 0 10E3/UL
IMM GRANULOCYTES NFR BLD: 0 %
INTERPRETATION ECG - MUSE: NORMAL
LYMPHOCYTES # BLD AUTO: 2.7 10E3/UL (ref 0.8–5.3)
LYMPHOCYTES NFR BLD AUTO: 39 %
MAGNESIUM SERPL-MCNC: 1.9 MG/DL (ref 1.7–2.3)
MCH RBC QN AUTO: 30.3 PG (ref 26.5–33)
MCHC RBC AUTO-ENTMCNC: 35.6 G/DL (ref 31.5–36.5)
MCV RBC AUTO: 85 FL (ref 78–100)
MONOCYTES # BLD AUTO: 0.6 10E3/UL (ref 0–1.3)
MONOCYTES NFR BLD AUTO: 9 %
NEUTROPHILS # BLD AUTO: 3.3 10E3/UL (ref 1.6–8.3)
NEUTROPHILS NFR BLD AUTO: 50 %
NRBC # BLD AUTO: 0 10E3/UL
NRBC BLD AUTO-RTO: 0 /100
P AXIS - MUSE: 49 DEGREES
PLATELET # BLD AUTO: 186 10E3/UL (ref 150–450)
POTASSIUM SERPL-SCNC: 3.7 MMOL/L (ref 3.4–5.3)
PR INTERVAL - MUSE: 164 MS
PROT SERPL-MCNC: 7 G/DL (ref 6.4–8.3)
QRS DURATION - MUSE: 72 MS
QT - MUSE: 358 MS
QTC - MUSE: 386 MS
R AXIS - MUSE: 42 DEGREES
RBC # BLD AUTO: 5.21 10E6/UL (ref 4.4–5.9)
SODIUM SERPL-SCNC: 133 MMOL/L (ref 135–145)
SYSTOLIC BLOOD PRESSURE - MUSE: NORMAL MMHG
T AXIS - MUSE: 56 DEGREES
TROPONIN T SERPL HS-MCNC: <6 NG/L
TSH SERPL DL<=0.005 MIU/L-ACNC: 0.73 UIU/ML (ref 0.3–4.2)
VENTRICULAR RATE- MUSE: 70 BPM
WBC # BLD AUTO: 6.8 10E3/UL (ref 4–11)

## 2024-05-23 PROCEDURE — 84484 ASSAY OF TROPONIN QUANT: CPT | Performed by: EMERGENCY MEDICINE

## 2024-05-23 PROCEDURE — A9585 GADOBUTROL INJECTION: HCPCS | Performed by: EMERGENCY MEDICINE

## 2024-05-23 PROCEDURE — 85025 COMPLETE CBC W/AUTO DIFF WBC: CPT | Performed by: EMERGENCY MEDICINE

## 2024-05-23 PROCEDURE — 36415 COLL VENOUS BLD VENIPUNCTURE: CPT | Performed by: EMERGENCY MEDICINE

## 2024-05-23 PROCEDURE — 70553 MRI BRAIN STEM W/O & W/DYE: CPT

## 2024-05-23 PROCEDURE — 84443 ASSAY THYROID STIM HORMONE: CPT | Performed by: EMERGENCY MEDICINE

## 2024-05-23 PROCEDURE — 255N000002 HC RX 255 OP 636: Performed by: EMERGENCY MEDICINE

## 2024-05-23 PROCEDURE — 83735 ASSAY OF MAGNESIUM: CPT | Performed by: EMERGENCY MEDICINE

## 2024-05-23 PROCEDURE — 80053 COMPREHEN METABOLIC PANEL: CPT | Performed by: EMERGENCY MEDICINE

## 2024-05-23 RX ORDER — GADOBUTROL 604.72 MG/ML
9 INJECTION INTRAVENOUS ONCE
Status: COMPLETED | OUTPATIENT
Start: 2024-05-23 | End: 2024-05-23

## 2024-05-23 RX ADMIN — GADOBUTROL 9 ML: 604.72 INJECTION INTRAVENOUS at 02:03

## 2024-05-23 ASSESSMENT — ACTIVITIES OF DAILY LIVING (ADL)
ADLS_ACUITY_SCORE: 35

## 2024-05-23 NOTE — ED PROVIDER NOTES
Emergency Department Note      History of Present Illness     Chief Complaint  Dizziness    HPI  Tanvi Tinajero is a 44 year old male who presents to the ED with dizziness. He explains that yesterday he developed dizziness and headache. He explains that throughout the week he has developed numbness in his head and neck. Further endorses nausea, eye pain, bilateral hip pain, and pain in his bilateral thighs. Denies vomiting, diarrhea. He reports having suffered numbness in various locations intermittently in the past year. He states that this started after he traveled to [place] for a hair transplant. He thinks he may have received other cosmetic surgeries without his consent while under sedation. He has also been living in [facility] over the past year, while there he had malaria roughly three months ago. He says he was treated for this.  He has had no fever.  He thinks that he may have had his organs removed without his consent when he was in another country.    Independent Historian  None    Review of External Notes  None  Past Medical History   Medical History and Problem List  Chronic constipation    Medications  Sodium Fluoride    Surgical History   Septorhinoplasty  Colonoscopy  Physical Exam   Patient Vitals for the past 24 hrs:   BP Temp Temp src Pulse Resp SpO2 Height Weight   05/22/24 2337 129/81 97.8  F (36.6  C) Temporal 86 18 100 % 1.829 m (6') 81.6 kg (180 lb)     Physical Exam  General: Sitting up in bed  Eyes:  The pupils are equal and round    Conjunctivae and sclerae are normal  ENT:    Atraumatic face  Neck:  Normal range of motion  CV:  Regular rate,  regular rhythm     Skin warm and well perfused   Resp:  Non labored breathing on room air    No tachypnea    No cough heard    Lungs clear bilaterally  GI:  Abdomen is soft, there is no rigidity    No distension    No rebound tenderness     No abdominal tenderness  MS:  Normal muscular tone  Skin:  No rash or acute skin lesions noted  Neuro:    Awake, alert.      Speech is normal and fluent.    Face is symmetric.     Moves all extremities equally    Gait normal    SILT on bilateral UE/LE/face    Finger to nose intact bilaterally  Psych: Normal affect.  Appropriate interactions.  Diagnostics   Lab Results   Labs Ordered and Resulted from Time of ED Arrival to Time of ED Departure   COMPREHENSIVE METABOLIC PANEL - Abnormal       Result Value    Sodium 133 (*)     Potassium 3.7      Carbon Dioxide (CO2) 24      Anion Gap 11      Urea Nitrogen 13.9      Creatinine 0.87      GFR Estimate >90      Calcium 9.1      Chloride 98      Glucose 123 (*)     Alkaline Phosphatase 72      AST 22      ALT 19      Protein Total 7.0      Albumin 4.1      Bilirubin Total 0.3     TROPONIN T, HIGH SENSITIVITY - Normal    Troponin T, High Sensitivity <6     MAGNESIUM - Normal    Magnesium 1.9     TSH WITH FREE T4 REFLEX - Normal    TSH 0.73     CBC WITH PLATELETS AND DIFFERENTIAL    WBC Count 6.8      RBC Count 5.21      Hemoglobin 15.8      Hematocrit 44.4      MCV 85      MCH 30.3      MCHC 35.6      RDW 11.7      Platelet Count 186      % Neutrophils 50      % Lymphocytes 39      % Monocytes 9      % Eosinophils 2      % Basophils 0      % Immature Granulocytes 0      NRBCs per 100 WBC 0      Absolute Neutrophils 3.3      Absolute Lymphocytes 2.7      Absolute Monocytes 0.6      Absolute Eosinophils 0.1      Absolute Basophils 0.0      Absolute Immature Granulocytes 0.0      Absolute NRBCs 0.0         Imaging  MR Brain w/o & w Contrast   Final Result   IMPRESSION:   1.  No acute intracranial process. Specifically, no findings to suggest demyelinating disease.        EKG     ECG results from 05/22/24   EKG 12-lead, tracing only     Value    Systolic Blood Pressure     Diastolic Blood Pressure     Ventricular Rate 70    Atrial Rate 70    SD Interval 164    QRS Duration 72        QTc 386    P Axis 49    R AXIS 42    T Axis 56    Interpretation ECG      Sinus rhythm  Low  voltage QRS  Borderline ECG  No previous ECGs available  Confirmed by GENERATED REPORT, COMPUTER ([id number]),  Simone Green ([id number]) on 5/23/2024 2:15:07 AM       ED Course    Medications Administered  Medications   gadobutrol (GADAVIST) injection 9 mL (9 mLs Intravenous $Given 5/23/24 0203)   sodium chloride (PF) 0.9% PF flush 10 mL (10 mLs Intravenous $Given 5/23/24 0204)     ED Course  ED Course as of 05/23/24 0029   Thu May 23, 2024   0020 I obtained history and examined the patient as noted above.     Medical Decision Making / Diagnosis     JANNET Tinajero is a 44 year old male who presented to the emergency department with multiple symptoms.  Many of the symptoms have been ongoing for many months.  He is concerned about multiple things including concerned that he has had surgeries without his consent in [place].  Reports concerns that his organs were removed.  He points to area on his abdomen which he thinks is a scar though this does not appear to be a surgical scar.  I see no areas on his body such as lower extremities or abdomen that would suggest a large surgery where someone could remove an organ.  Unclear if this is paranoia.  Blood work overall unremarkable.  Normal hemoglobin.  He has had no fevers so unlikely malaria.  He has a nonfocal neurologic exam.  MRI brain shows no acute pathology.  Discussed with the patient that he should follow-up with primary care provider.  Unlikely Guillain-Barré given history.  Unlikely infectious cause of symptoms.  No indication for hospitalization or further workup in the emergency department at this time.    Disposition  The patient was discharged.     ICD-10 Codes:    ICD-10-CM    1. Hyponatremia  E87.1       2. Numbness  R20.0       3. Weakness  R53.1            SHERRY KAUR, am serving as a scribe at 12:12 AM on 5/23/2024 to document services personally performed by Luz Melgar MD, based on my observations and the  provider's statements to me.      Luz Melgar MD  05/23/24 0658

## 2024-05-23 NOTE — ED TRIAGE NOTES
Patient here  with dizziness with head numbness. He also having body pain . He stated his symptoms started yesterday     Triage Assessment (Adult)       Row Name 05/22/24 4811          Triage Assessment    Airway WDL WDL        Respiratory WDL    Respiratory WDL WDL        Skin Circulation/Temperature WDL    Skin Circulation/Temperature WDL WDL        Cardiac WDL    Cardiac WDL WDL        Peripheral/Neurovascular WDL    Peripheral Neurovascular WDL WDL        Cognitive/Neuro/Behavioral WDL    Cognitive/Neuro/Behavioral WDL WDL

## 2024-07-13 ENCOUNTER — HEALTH MAINTENANCE LETTER (OUTPATIENT)
Age: 44
End: 2024-07-13

## 2024-09-15 ENCOUNTER — HEALTH MAINTENANCE LETTER (OUTPATIENT)
Age: 44
End: 2024-09-15

## 2025-07-19 ENCOUNTER — HEALTH MAINTENANCE LETTER (OUTPATIENT)
Age: 45
End: 2025-07-19